# Patient Record
Sex: FEMALE | Race: WHITE | NOT HISPANIC OR LATINO | Employment: UNEMPLOYED | URBAN - METROPOLITAN AREA
[De-identification: names, ages, dates, MRNs, and addresses within clinical notes are randomized per-mention and may not be internally consistent; named-entity substitution may affect disease eponyms.]

---

## 2017-02-23 DIAGNOSIS — Z30.09 FAMILY PLANNING: ICD-10-CM

## 2017-02-23 RX ORDER — LEVONORGESTREL AND ETHINYL ESTRADIOL 90; 20 UG/1; UG/1
1 TABLET ORAL DAILY
Qty: 30 TABLET | Refills: 2 | Status: SHIPPED | OUTPATIENT
Start: 2017-02-23 | End: 2017-05-31 | Stop reason: SDUPTHER

## 2017-02-23 NOTE — TELEPHONE ENCOUNTER
----- Message from Caridad Luu sent at 2/23/2017 12:35 PM CST -----  Contact: SELF  REFILL: levonorgestrel-ethinyl estrad (LALO) 90-20 mcg per tablet

## 2017-02-24 NOTE — TELEPHONE ENCOUNTER
Left message for patient regarding Rx approval, and to call the office to schedule an appointment to see Dr Damon.

## 2017-05-31 ENCOUNTER — OFFICE VISIT (OUTPATIENT)
Dept: OBSTETRICS AND GYNECOLOGY | Facility: CLINIC | Age: 32
End: 2017-05-31
Payer: MEDICAID

## 2017-05-31 VITALS
WEIGHT: 105 LBS | HEIGHT: 59 IN | BODY MASS INDEX: 21.17 KG/M2 | SYSTOLIC BLOOD PRESSURE: 132 MMHG | DIASTOLIC BLOOD PRESSURE: 82 MMHG

## 2017-05-31 DIAGNOSIS — Z30.09 FAMILY PLANNING: ICD-10-CM

## 2017-05-31 DIAGNOSIS — R10.2 FEMALE PELVIC PAIN: ICD-10-CM

## 2017-05-31 DIAGNOSIS — Z87.42 PERSONAL HISTORY OF ENDOMETRIOSIS: ICD-10-CM

## 2017-05-31 DIAGNOSIS — Z01.419 WELL WOMAN EXAM WITH ROUTINE GYNECOLOGICAL EXAM: Primary | ICD-10-CM

## 2017-05-31 PROCEDURE — 87591 N.GONORRHOEAE DNA AMP PROB: CPT

## 2017-05-31 PROCEDURE — 99999 PR PBB SHADOW E&M-EST. PATIENT-LVL III: CPT | Mod: PBBFAC,,, | Performed by: OBSTETRICS & GYNECOLOGY

## 2017-05-31 PROCEDURE — 88175 CYTOPATH C/V AUTO FLUID REDO: CPT

## 2017-05-31 PROCEDURE — 99213 OFFICE O/P EST LOW 20 MIN: CPT | Mod: PBBFAC | Performed by: OBSTETRICS & GYNECOLOGY

## 2017-05-31 PROCEDURE — 99395 PREV VISIT EST AGE 18-39: CPT | Mod: S$PBB,,, | Performed by: OBSTETRICS & GYNECOLOGY

## 2017-05-31 RX ORDER — LEVONORGESTREL AND ETHINYL ESTRADIOL 90; 20 UG/1; UG/1
1 TABLET ORAL DAILY
Qty: 30 TABLET | Refills: 6 | Status: SHIPPED | OUTPATIENT
Start: 2017-05-31 | End: 2018-01-04 | Stop reason: SDUPTHER

## 2017-05-31 NOTE — PROGRESS NOTES
Subjective:       Patient ID: [place] Lebron is a 31 y.o. female.    Chief Complaint:  Annual Exam and Abdominal Pain      History of Present Illness  HPI  Annual Exam-Premenopausal  Patient presents for annual exam. The patient has no complaints today. The patient is sexually active. GYN screening history: last pap: approximate date 2014 and was normal. The patient wears seatbelts: yes. The patient participates in regular exercise: yes. Has the patient ever been transfused or tattooed?: yes. The patient reports that there is not domestic violence in her life.    Patient with a long history of endometriosis.  Status post  sections x 2  History of cervical dysplasia. Status post LEEP      GYN & OB History  Patient's last menstrual period was 2016.   Date of Last Pap: 2014    OB History    Para Term  AB Living   2 2 1 1  2   SAB TAB Ectopic Multiple Live Births       2      # Outcome Date GA Lbr Kelvin/2nd Weight Sex Delivery Anes PTL Lv   2 Term 09 39w0d  3.238 kg (7 lb 2.2 oz) M CS-LTranv Spinal N ANDI   1  04 34w0d  2.24 kg (4 lb 15 oz) F CS-LTranv Gen, Spinal Y ANDI      Complications: Abruptio Placenta        Past Medical History:   Diagnosis Date    Abnormal Pap smear 04/06/10    mild dysplasia    Abnormal Pap smear of cervix     Endometriosis     Hypertension        Past Surgical History:   Procedure Laterality Date    CERVICAL BIOPSY  W/ LOOP ELECTRODE EXCISION  2010    Mild dysplasia     SECTION, LOW TRANSVERSE       SECTION, LOW TRANSVERSE      PELVIC LAPAROSCOPY         Family History   Problem Relation Age of Onset    Hypertension Father     Hypertension Mother        Social History     Social History    Marital status:      Spouse name: N/A    Number of children: N/A    Years of education: N/A     Social History Main Topics    Smoking status: Current Every Day Smoker     Packs/day: 0.50     Years:  13.00    Smokeless tobacco: Never Used    Alcohol use No    Drug use: No    Sexual activity: Not Currently     Partners: Male     Birth control/ protection: OCP     Other Topics Concern    None     Social History Narrative    Together since 4/2015    He is a     She is a homemaker       Current Outpatient Prescriptions   Medication Sig Dispense Refill    levonorgestrel-ethinyl estrad (LALO) 90-20 mcg per tablet Take 1 tablet by mouth once daily. 30 tablet 6     No current facility-administered medications for this visit.        Review of patient's allergies indicates:  No Known Allergies  Review of Systems  Review of Systems   Constitutional: Negative for activity change, appetite change, chills, fatigue, fever and unexpected weight change.   HENT: Negative for mouth sores.    Respiratory: Negative for cough, shortness of breath and wheezing.    Cardiovascular: Negative for chest pain and palpitations.   Gastrointestinal: Positive for abdominal pain. Negative for bloating, blood in stool, constipation, nausea and vomiting.   Endocrine: Negative for diabetes and hot flashes.   Genitourinary: Positive for dyspareunia and pelvic pain. Negative for dysuria, frequency, hematuria, menorrhagia, menstrual problem, urgency, vaginal bleeding, vaginal discharge, vaginal pain, dysmenorrhea, urinary incontinence, postcoital bleeding and vaginal odor.   Musculoskeletal: Negative for back pain and myalgias.   Skin:  Negative for rash.   Neurological: Negative for seizures and headaches.   Psychiatric/Behavioral: Negative for depression and sleep disturbance. The patient is not nervous/anxious.    Breast: Negative for breast mass, breast pain and nipple discharge          Objective:    Physical Exam:   Constitutional: She appears well-developed and well-nourished. No distress.    HENT:   Head: Normocephalic and atraumatic.    Eyes: EOM are normal.    Neck: Normal range of motion.     Pulmonary/Chest: Effort  normal. No respiratory distress.   Breasts: Non-tender, no engorgement, no masses, no retraction, no discharge. Negative for lymphadenopathy.         Abdominal: Soft. She exhibits no distension. There is tenderness. There is no rebound and no guarding.     Genitourinary: Vagina normal and uterus normal. No vaginal discharge found.   Genitourinary Comments: Vulva without any obvious lesions.  Vaginal vault with good support.  Minimal discharge noted.  No obvious lesion.  Cervix is without any cervical motion tenderness.  No obvious lesion except for ectropion.  Uterus is small, non-tender, normal contour.  Adnexa is without any masses or tenderness.           Musculoskeletal: Normal range of motion.       Neurological: She is alert.    Skin: Skin is warm and dry.    Psychiatric: She has a normal mood and affect.   Anxious            Assessment:        1. Well woman exam with routine gynecological exam    2. Female pelvic pain    3. Personal history of endometriosis    4. Family planning    5.  History of cervical dysplasia          Plan:          I have discussed with the patient her condition.  Monthly breast examination was instructed, discussed, and encouraged.  Patient was encouraged to consume a low-calorie, low fat diet, and to increase of physical activity.  Healthy habits encouraged.  A Pap smear was performed.  Mammogram was not ordered because of the combination of her age and risk factors.  Gonorrhea and Chlamydia testing not performed.  HIV test not ordered.  Patient is to continue her medication, Shreya.  She will come back to see me in one year for her annual visit.  She can come back to see me sooner as necessary.  All of her questions were answered appropriately to her satisfaction.

## 2017-06-01 LAB
C TRACH DNA SPEC QL NAA+PROBE: NOT DETECTED
N GONORRHOEA DNA SPEC QL NAA+PROBE: NOT DETECTED

## 2017-06-05 ENCOUNTER — TELEPHONE (OUTPATIENT)
Dept: OBSTETRICS AND GYNECOLOGY | Facility: CLINIC | Age: 32
End: 2017-06-05

## 2017-09-26 ENCOUNTER — TELEPHONE (OUTPATIENT)
Dept: OBSTETRICS AND GYNECOLOGY | Facility: CLINIC | Age: 32
End: 2017-09-26

## 2017-09-26 NOTE — TELEPHONE ENCOUNTER
----- Message from Marina Baptiste sent at 9/26/2017  3:49 PM CDT -----  Contact: self  Pt states she is having a lot of breast pain. She is asking for an appointment before the first available on 10/10.      [phone]

## 2018-01-04 DIAGNOSIS — Z30.09 FAMILY PLANNING: ICD-10-CM

## 2018-01-08 RX ORDER — LEVONORGESTREL AND ETHINYL ESTRADIOL 90; 20 UG/1; UG/1
1 TABLET ORAL DAILY
Qty: 28 TABLET | Refills: 6 | Status: SHIPPED | OUTPATIENT
Start: 2018-01-08 | End: 2018-07-19 | Stop reason: SDUPTHER

## 2018-07-03 ENCOUNTER — OFFICE VISIT (OUTPATIENT)
Dept: OBSTETRICS AND GYNECOLOGY | Facility: CLINIC | Age: 33
End: 2018-07-03
Payer: MEDICAID

## 2018-07-03 VITALS
BODY MASS INDEX: 23.28 KG/M2 | DIASTOLIC BLOOD PRESSURE: 74 MMHG | WEIGHT: 115.5 LBS | SYSTOLIC BLOOD PRESSURE: 110 MMHG | HEIGHT: 59 IN

## 2018-07-03 DIAGNOSIS — Z87.42 PERSONAL HISTORY OF ENDOMETRIOSIS: ICD-10-CM

## 2018-07-03 DIAGNOSIS — Z01.419 WELL WOMAN EXAM WITH ROUTINE GYNECOLOGICAL EXAM: Primary | ICD-10-CM

## 2018-07-03 DIAGNOSIS — R10.2 FEMALE PELVIC PAIN: ICD-10-CM

## 2018-07-03 PROCEDURE — 99213 OFFICE O/P EST LOW 20 MIN: CPT | Mod: PBBFAC | Performed by: OBSTETRICS & GYNECOLOGY

## 2018-07-03 PROCEDURE — 99395 PREV VISIT EST AGE 18-39: CPT | Mod: S$PBB,,, | Performed by: OBSTETRICS & GYNECOLOGY

## 2018-07-03 PROCEDURE — 99999 PR PBB SHADOW E&M-EST. PATIENT-LVL III: CPT | Mod: PBBFAC,,, | Performed by: OBSTETRICS & GYNECOLOGY

## 2018-07-03 PROCEDURE — 87491 CHLMYD TRACH DNA AMP PROBE: CPT

## 2018-07-03 RX ORDER — NAPROXEN 500 MG/1
TABLET ORAL
Refills: 0 | COMMUNITY
Start: 2018-05-31 | End: 2018-07-26

## 2018-07-03 RX ORDER — PROPRANOLOL HYDROCHLORIDE 40 MG/5ML
SOLUTION ORAL 3 TIMES DAILY
COMMUNITY
End: 2021-04-22

## 2018-07-03 RX ORDER — PREDNISONE 20 MG/1
20 TABLET ORAL 2 TIMES DAILY
Refills: 0 | COMMUNITY
Start: 2018-05-11 | End: 2018-07-26

## 2018-07-03 NOTE — PROGRESS NOTES
Subjective:       Patient ID: Ruby Lebron is a 32 y.o. female.    Chief Complaint:  Gynecologic Exam      History of Present Illness  HPI  Annual Exam-Premenopausal  Patient presents for annual exam. The patient has multiple complaints today. The patient is sexually active. GYN screening history: last pap: approximate date  and was normal. The patient wears seatbelts: yes. The patient participates in regular exercise: no. Has the patient ever been transfused or tattooed?: yes. The patient reports that there is not domestic violence in her life.    1. Having left-sided pelvic pain for the past three weeks.  Mostly constant cramping with occasional sharp pulling pain.  No radiation, just localized on left mostly.  No aggravating factor.  Sitting with knees up helps with the pain.  So has soaking in warm water.  Denies fever or chills.  No nausea or vomiting.  Long history of endometriosis and pelvic adhesions and pain.  Status post  sections x 2.    History of placental abruption  History of abnormal Pap, LGSIL  History of multiple laparoscopies.  2.  Breast pain and cysts for the past several weeks.    Long history of fibrocystic breasts.  Tender with masses.  But it gets better...        GYN & OB History  No LMP recorded. Patient is not currently having periods (Reason: Birth Control).   Date of Last Pap: 2017    OB History    Para Term  AB Living   2 2 1 1   2   SAB TAB Ectopic Multiple Live Births           2      # Outcome Date GA Lbr Kelvin/2nd Weight Sex Delivery Anes PTL Lv   2 Term 09 39w0d  3.238 kg (7 lb 2.2 oz) M CS-LTranv Spinal N ANDI   1  04 34w0d  2.24 kg (4 lb 15 oz) F CS-LTranv Gen, Spinal Y ANDI      Complications: Abruptio Placenta        Past Medical History:   Diagnosis Date    Abnormal Pap smear 04/06/10    mild dysplasia    Abnormal Pap smear of cervix     Endometriosis     Hypertension        Past Surgical History:   Procedure  Laterality Date    CERVICAL BIOPSY  W/ LOOP ELECTRODE EXCISION  2010    Mild dysplasia     SECTION, LOW TRANSVERSE       SECTION, LOW TRANSVERSE      PELVIC LAPAROSCOPY         Family History   Problem Relation Age of Onset    Hypertension Father     Hypertension Mother        Social History     Social History    Marital status:      Spouse name: N/A    Number of children: N/A    Years of education: N/A     Social History Main Topics    Smoking status: Current Every Day Smoker     Packs/day: 0.50     Years: 13.00    Smokeless tobacco: Never Used    Alcohol use No    Drug use: No    Sexual activity: Not Currently     Partners: Male     Birth control/ protection: OCP     Other Topics Concern    None     Social History Narrative    Together since 2015    He is a     She is a homemaker       Current Outpatient Prescriptions   Medication Sig Dispense Refill    propranolol (INDERAL) 40 mg/5 mL (8 mg/mL) Soln Take by mouth 3 (three) times daily.      levonorgestrel-ethinyl estrad (LYBREL) 90-20 mcg per tablet take 1 tablet by mouth once daily 28 tablet 6    naproxen (NAPROSYN) 500 MG tablet TK 1 T PO  BID PRN P FOR 10 DAYS  0    predniSONE (DELTASONE) 20 MG tablet Take 20 mg by mouth 2 (two) times daily.  0     No current facility-administered medications for this visit.        Review of patient's allergies indicates:  No Known Allergies    Review of Systems  Review of Systems   Constitutional: Positive for fatigue. Negative for activity change, appetite change, chills, fever and unexpected weight change.   HENT: Negative for mouth sores.    Respiratory: Negative for cough, shortness of breath and wheezing.    Cardiovascular: Negative for chest pain and palpitations.   Gastrointestinal: Positive for abdominal pain. Negative for bloating, blood in stool, constipation, nausea and vomiting.   Endocrine: Negative for diabetes and hot flashes.   Genitourinary:  Positive for pelvic pain. Negative for dyspareunia, dysuria, frequency, hematuria, menorrhagia, menstrual problem, urgency, vaginal bleeding, vaginal discharge, vaginal pain, dysmenorrhea, urinary incontinence, postcoital bleeding and vaginal odor.   Musculoskeletal: Negative for back pain and myalgias.   Skin:  Negative for rash.   Neurological: Negative for seizures and headaches.   Psychiatric/Behavioral: Negative for depression and sleep disturbance. The patient is not nervous/anxious.    Breast: Positive for breast pain.Negative for breast mass and nipple discharge          Objective:    Physical Exam:   Constitutional: She appears well-developed and well-nourished. No distress.    HENT:   Head: Normocephalic and atraumatic.    Eyes: EOM are normal.    Neck: Normal range of motion.     Pulmonary/Chest: Effort normal. No respiratory distress.   Breasts: Non-tender, no engorgement, no masses, no retraction, no discharge. Negative for lymphadenopathy.   No dominant mass        Abdominal: Soft. She exhibits no distension. There is no tenderness. There is no rebound and no guarding.     Genitourinary: Vagina normal and uterus normal. No vaginal discharge found.   Genitourinary Comments: Vulva without any obvious lesions.  Vaginal vault with good support.  Minimal dark red discharge noted.  No obvious lesion.  Cervix is without any cervical motion tenderness.  No obvious lesion except for ectropion.  Uterus is small, non-tender, normal contour.  Adnexa is without any masses.  Bilateral tenderness.               Musculoskeletal: Normal range of motion.       Neurological: She is alert.    Skin: Skin is warm and dry.    Psychiatric: She has a normal mood and affect.          Assessment:        1. Female pelvic pain    2. Personal history of endometriosis    3.  Well woman visit         Plan:          I have discussed with the patient her condition.  Monthly breast examination was instructed, discussed, and encouraged.   Patient was encouraged to consume a low-calorie, low fat diet, and to increase of physical activity.  Healthy habits encouraged.  A Pap smear was NOT performed.  Mammogram was not ordered because of the combination of her age and risk factors.  Gonorrhea and Chlamydia testing performed.  HIV test not ordered.  Patient is to continue her medications as prescribed.  She will come back to see me in one year for her annual visit.  She can come back to see me sooner as necessary.  All of her questions were answered appropriately to her satisfaction.     She will continue with oral contraceptive and naproxen for now  She would need to stop smoking soon.  She might have to stop taking oral contraceptive.    Pelvic ultrasound  Back 2 weeks after ultrasound    Consider total abdominal hysterectomy with bilateral salpingectomy for history of endometriosis, chronic pelvic pain, pelvic adhesions...

## 2018-07-04 LAB
C TRACH DNA SPEC QL NAA+PROBE: NOT DETECTED
N GONORRHOEA DNA SPEC QL NAA+PROBE: NOT DETECTED

## 2018-07-05 ENCOUNTER — TELEPHONE (OUTPATIENT)
Dept: OBSTETRICS AND GYNECOLOGY | Facility: CLINIC | Age: 33
End: 2018-07-05

## 2018-07-05 NOTE — TELEPHONE ENCOUNTER
Pt taking Naprosyn and tylenol and says it is not helping her. Pt wants to know if you will give her something else for the pain. Pt has ultrasound scheduled.

## 2018-07-05 NOTE — TELEPHONE ENCOUNTER
----- Message from Scarlet Benjamin sent at 7/5/2018 10:40 AM CDT -----  Contact: [phone]  Pt is requesting something for pain and wants o speak to you regarding the surgery you spoke of. Pls call pt [phone]. Thanks.......Delfina

## 2018-07-06 NOTE — TELEPHONE ENCOUNTER
----- Message from Ebony Durbin sent at 7/6/2018 10:56 AM CDT -----  Contact: Self/ [phone]  Pt calling to follow up on medication request for pain. Please call to advise. Thank you.  *--------------------------------------  PAIN TO L SIDE  THAT IS SEVERE CRAMPING WITH SHARP SHOOTING PAIN, SHE HAS TO KEEP L LEG DRAWN UP TO EASE THE PAIN , CONTINUES WITH VAGINAL BLEEDING ALSO, HAVING PAIN FOR APPROX 3 WEEKS, PT SAW  ON Monday, SHE HAS AN U.S. ON 7/11/18. SHE IS REQUESTING SOMETHING FOR HER PAIN    MESSAGE FORWARDED TO DR TORRES

## 2018-07-09 ENCOUNTER — TELEPHONE (OUTPATIENT)
Dept: OBSTETRICS AND GYNECOLOGY | Facility: CLINIC | Age: 33
End: 2018-07-09

## 2018-07-09 NOTE — TELEPHONE ENCOUNTER
You 1 minute ago (10:15 AM)         7/9/18 @ 1015 (PHILIPP)  SPOKE WITH MS EZEKIEL, INFORMED HER THAT SHE IS TO CONTINUE WITH THE PAIN MEDICATION THAT DR TORRES HAS ORDERED , INFORMED HER SHE NEEDS TO MAKE AN APPT TO SEE HIM, HE IS OUT OF TOWN THIS WEEK , APPT MADE FOR 7/26/18 @ 4:00 P          Documentation

## 2018-07-09 NOTE — TELEPHONE ENCOUNTER
7/9/18 @ 1015 (PHILIPP)  SPOKE WITH MS FALCON, INFORMED HER THAT SHE IS TO CONTINUE WITH THE PAIN MEDICATION THAT DR TORRES HAS ORDERED , INFORMED HER SHE NEEDS TO MAKE AN APPT TO SEE HIM, HE IS OUT OF TOWN THIS WEEK , APPT MADE FOR 7/26/18 @ 4:00 P

## 2018-07-11 ENCOUNTER — HOSPITAL ENCOUNTER (OUTPATIENT)
Dept: RADIOLOGY | Facility: HOSPITAL | Age: 33
Discharge: HOME | End: 2018-07-11
Attending: OBSTETRICS & GYNECOLOGY
Payer: MEDICAID

## 2018-07-11 DIAGNOSIS — Z87.42 PERSONAL HISTORY OF ENDOMETRIOSIS: ICD-10-CM

## 2018-07-11 DIAGNOSIS — R10.2 FEMALE PELVIC PAIN: ICD-10-CM

## 2018-07-11 PROCEDURE — 76830 TRANSVAGINAL US NON-OB: CPT | Mod: TC

## 2018-07-11 PROCEDURE — 76830 TRANSVAGINAL US NON-OB: CPT | Mod: 26,,, | Performed by: RADIOLOGY

## 2018-07-11 PROCEDURE — 76856 US EXAM PELVIC COMPLETE: CPT | Mod: TC

## 2018-07-11 PROCEDURE — 76856 US EXAM PELVIC COMPLETE: CPT | Mod: 26,,, | Performed by: RADIOLOGY

## 2018-07-19 ENCOUNTER — TELEPHONE (OUTPATIENT)
Dept: OBSTETRICS AND GYNECOLOGY | Facility: CLINIC | Age: 33
End: 2018-07-19

## 2018-07-19 DIAGNOSIS — Z30.09 FAMILY PLANNING: ICD-10-CM

## 2018-07-19 RX ORDER — LEVONORGESTREL AND ETHINYL ESTRADIOL 90; 20 UG/1; UG/1
1 TABLET ORAL DAILY
Qty: 28 TABLET | Refills: 6 | Status: SHIPPED | OUTPATIENT
Start: 2018-07-19 | End: 2018-08-24

## 2018-07-19 NOTE — TELEPHONE ENCOUNTER
----- Message from Brittany Naranjo sent at 7/19/2018  1:37 PM CDT -----  Contact: self  Patient needs a refill of birth control please call back at [phone]    [facility] [facility] - [place] - [address] [phone]

## 2018-07-19 NOTE — TELEPHONE ENCOUNTER
Called pt to inform her that per her request, birth control has been refilled. No answer. Lm for a call back to the office if any further questions. CW

## 2018-07-20 NOTE — TELEPHONE ENCOUNTER
Called pt again to inform her birth control refill has been submitted. No answer. Lm for call back if any further questions. CW

## 2018-07-26 ENCOUNTER — OFFICE VISIT (OUTPATIENT)
Dept: OBSTETRICS AND GYNECOLOGY | Facility: CLINIC | Age: 33
End: 2018-07-26
Payer: MEDICAID

## 2018-07-26 VITALS
HEIGHT: 59 IN | DIASTOLIC BLOOD PRESSURE: 74 MMHG | SYSTOLIC BLOOD PRESSURE: 124 MMHG | BODY MASS INDEX: 23.51 KG/M2 | WEIGHT: 116.63 LBS

## 2018-07-26 DIAGNOSIS — R10.2 FEMALE PELVIC PAIN: ICD-10-CM

## 2018-07-26 DIAGNOSIS — Z87.42 PERSONAL HISTORY OF ENDOMETRIOSIS: Primary | ICD-10-CM

## 2018-07-26 PROCEDURE — 99213 OFFICE O/P EST LOW 20 MIN: CPT | Mod: PBBFAC | Performed by: OBSTETRICS & GYNECOLOGY

## 2018-07-26 PROCEDURE — 99999 PR PBB SHADOW E&M-EST. PATIENT-LVL III: CPT | Mod: PBBFAC,,, | Performed by: OBSTETRICS & GYNECOLOGY

## 2018-07-26 PROCEDURE — 99213 OFFICE O/P EST LOW 20 MIN: CPT | Mod: S$PBB,,, | Performed by: OBSTETRICS & GYNECOLOGY

## 2018-07-26 NOTE — PATIENT INSTRUCTIONS
You are scheduled for total laparoscopic hysterectomy with left salpingo-oophorectomy with right salpingectomy on 8/15/2018

## 2018-07-26 NOTE — PROGRESS NOTES
Subjective:       Patient ID: Ruby Lebron is a 32 y.o. female.    Chief Complaint:  Follow-up (follow up ultrasound for pelvic pain)      History of Present Illness  HPI  Patient comes in today requesting surgery.  Seen on 7/3/2018 with severe left-sided pain.  Long history of endometriosis and pelvic adhesions and pain.  Status post  sections x 2.    History of placental abruption  History of abnormal Pap, LGSIL  History of multiple laparoscopies.    Pelvic ultrasound on 2018 shows  The uterus measures 6.9 by 3.2 x 3.9 cm.  The endometrial stripe measures 5 mm.  The right ovary measures 3.2 x 2.1 x 2.5 cm in the left ovary measures 2.2 x 1.3 x 1.6 cm.  No free fluid is seen.        Would like to get hysterectomy for pelvic pain, endometriosis.  Risks and benefits discussed    GYN & OB History  Patient's last menstrual period was 2018 (exact date).   Date of Last Pap: 2017    OB History    Para Term  AB Living   2 2 1 1   2   SAB TAB Ectopic Multiple Live Births           2      # Outcome Date GA Lbr Kelvin/2nd Weight Sex Delivery Anes PTL Lv   2 Term 09 39w0d  3.238 kg (7 lb 2.2 oz) M CS-LTranv Spinal N ANDI   1  04 34w0d  2.24 kg (4 lb 15 oz) F CS-LTranv Gen, Spinal Y ANDI      Complications: Abruptio Placenta        Past Medical History:   Diagnosis Date    Abnormal Pap smear 04/06/10    mild dysplasia    Abnormal Pap smear of cervix     Endometriosis     Hypertension        Past Surgical History:   Procedure Laterality Date    CERVICAL BIOPSY  W/ LOOP ELECTRODE EXCISION  2010    Mild dysplasia     SECTION, LOW TRANSVERSE       SECTION, LOW TRANSVERSE      PELVIC LAPAROSCOPY         Family History   Problem Relation Age of Onset    Hypertension Father     Hypertension Mother        Social History     Social History    Marital status:      Spouse name: N/A    Number of children: N/A    Years of education:  N/A     Social History Main Topics    Smoking status: Current Every Day Smoker     Packs/day: 0.50     Years: 13.00    Smokeless tobacco: Never Used    Alcohol use No    Drug use: No    Sexual activity: Not Currently     Partners: Male     Birth control/ protection: OCP     Other Topics Concern    None     Social History Narrative    Together since 4/2015    He is a     She is a homemaker       Current Outpatient Prescriptions   Medication Sig Dispense Refill    levonorgestrel-ethinyl estrad (LYBREL) 90-20 mcg per tablet Take 1 tablet by mouth once daily. 28 tablet 6    propranolol (INDERAL) 40 mg/5 mL (8 mg/mL) Soln Take by mouth 3 (three) times daily.       No current facility-administered medications for this visit.        Review of patient's allergies indicates:  No Known Allergies    Review of Systems  Review of Systems   Constitutional: Negative for activity change, appetite change, chills, fatigue, fever and unexpected weight change.   HENT: Negative for mouth sores.    Respiratory: Negative for cough, shortness of breath and wheezing.    Cardiovascular: Negative for chest pain and palpitations.   Gastrointestinal: Positive for abdominal pain. Negative for bloating, blood in stool, constipation, nausea and vomiting.   Endocrine: Negative for diabetes and hot flashes.   Genitourinary: Positive for pelvic pain. Negative for dyspareunia, dysuria, frequency, hematuria, menorrhagia, menstrual problem, urgency, vaginal bleeding, vaginal discharge, vaginal pain, dysmenorrhea, urinary incontinence, postcoital bleeding and vaginal odor.        Left-sided pelvic pain   Musculoskeletal: Negative for back pain and myalgias.   Skin:  Negative for rash.   Neurological: Negative for seizures and headaches.   Psychiatric/Behavioral: Negative for depression and sleep disturbance. The patient is not nervous/anxious.    Breast: Positive for breast pain.Negative for breast mass and nipple discharge           Objective:    Physical Exam:   Constitutional: She appears well-developed and well-nourished. No distress.   In pain    HENT:   Head: Normocephalic and atraumatic.    Eyes: EOM are normal.    Neck: Normal range of motion.    Cardiovascular: Normal rate.     Pulmonary/Chest: Effort normal. No respiratory distress.                  Musculoskeletal: Normal range of motion.       Neurological: She is alert.    Skin: Skin is warm and dry.    Psychiatric: She has a normal mood and affect.          Assessment:        1. Personal history of endometriosis    2. Female pelvic pain    3.  Status post  sections x 2  4.   History of pelvic adhesions         Plan:      I have extensively discussed with the patient regarding her condition  We discussed surgery for endometriosis  No longer wants to have children  Would like to have left ovary removed for pain    We discussed possible TLHBSx with left oophorectomy  Risks and benefits of surgery discussed, including risks of bladder and bowel injury, possible laparotomy and  Abdominal  hysterectomy  We will try to schedule surgery for 8/15/2018.  Would need to call surgery tomorrow to schedule.    Possible preop on 2018.

## 2018-07-27 DIAGNOSIS — R10.2 FEMALE PELVIC PAIN: ICD-10-CM

## 2018-07-27 DIAGNOSIS — Z87.42 PERSONAL HISTORY OF ENDOMETRIOSIS: Primary | ICD-10-CM

## 2018-08-10 ENCOUNTER — HOSPITAL ENCOUNTER (OUTPATIENT)
Dept: PREADMISSION TESTING | Facility: HOSPITAL | Age: 33
Discharge: HOME | End: 2018-08-10
Attending: OBSTETRICS & GYNECOLOGY
Payer: MEDICAID

## 2018-08-10 ENCOUNTER — HOSPITAL ENCOUNTER (OUTPATIENT)
Dept: RADIOLOGY | Facility: HOSPITAL | Age: 33
Discharge: HOME | End: 2018-08-10
Attending: OBSTETRICS & GYNECOLOGY
Payer: MEDICAID

## 2018-08-10 ENCOUNTER — OFFICE VISIT (OUTPATIENT)
Dept: OBSTETRICS AND GYNECOLOGY | Facility: CLINIC | Age: 33
End: 2018-08-10
Payer: MEDICAID

## 2018-08-10 VITALS
BODY MASS INDEX: 24.13 KG/M2 | SYSTOLIC BLOOD PRESSURE: 134 MMHG | DIASTOLIC BLOOD PRESSURE: 82 MMHG | WEIGHT: 119.69 LBS | TEMPERATURE: 98 F | HEIGHT: 59 IN

## 2018-08-10 VITALS
HEIGHT: 59 IN | BODY MASS INDEX: 24.18 KG/M2 | SYSTOLIC BLOOD PRESSURE: 123 MMHG | RESPIRATION RATE: 16 BRPM | OXYGEN SATURATION: 99 % | HEART RATE: 85 BPM | DIASTOLIC BLOOD PRESSURE: 85 MMHG | TEMPERATURE: 98 F | WEIGHT: 119.94 LBS

## 2018-08-10 DIAGNOSIS — Z87.42 PERSONAL HISTORY OF ENDOMETRIOSIS: ICD-10-CM

## 2018-08-10 DIAGNOSIS — Z87.42 PERSONAL HISTORY OF ENDOMETRIOSIS: Primary | ICD-10-CM

## 2018-08-10 DIAGNOSIS — R10.2 FEMALE PELVIC PAIN: ICD-10-CM

## 2018-08-10 DIAGNOSIS — Z01.818 PRE-OP TESTING: Primary | ICD-10-CM

## 2018-08-10 LAB
ALBUMIN SERPL BCP-MCNC: 4 G/DL
ALP SERPL-CCNC: 69 U/L
ALT SERPL W/O P-5'-P-CCNC: 22 U/L
ANION GAP SERPL CALC-SCNC: 5 MMOL/L
AST SERPL-CCNC: 28 U/L
BACTERIA #/AREA URNS HPF: NORMAL /HPF
BASOPHILS # BLD AUTO: 0.04 K/UL
BASOPHILS NFR BLD: 0.4 %
BILIRUB SERPL-MCNC: 0.4 MG/DL
BILIRUB UR QL STRIP: NEGATIVE
BUN SERPL-MCNC: 10 MG/DL
CALCIUM SERPL-MCNC: 9 MG/DL
CHLORIDE SERPL-SCNC: 107 MMOL/L
CLARITY UR: CLEAR
CO2 SERPL-SCNC: 24 MMOL/L
COLOR UR: YELLOW
CREAT SERPL-MCNC: 0.7 MG/DL
DIFFERENTIAL METHOD: ABNORMAL
EOSINOPHIL # BLD AUTO: 0.1 K/UL
EOSINOPHIL NFR BLD: 0.9 %
ERYTHROCYTE [DISTWIDTH] IN BLOOD BY AUTOMATED COUNT: 12.1 %
EST. GFR  (AFRICAN AMERICAN): >60 ML/MIN/1.73 M^2
EST. GFR  (NON AFRICAN AMERICAN): >60 ML/MIN/1.73 M^2
GLUCOSE SERPL-MCNC: 83 MG/DL
GLUCOSE UR QL STRIP: NEGATIVE
HCT VFR BLD AUTO: 35.9 %
HGB BLD-MCNC: 12.1 G/DL
HGB UR QL STRIP: ABNORMAL
KETONES UR QL STRIP: NEGATIVE
LEUKOCYTE ESTERASE UR QL STRIP: NEGATIVE
LYMPHOCYTES # BLD AUTO: 4.8 K/UL
LYMPHOCYTES NFR BLD: 50.7 %
MCH RBC QN AUTO: 31.3 PG
MCHC RBC AUTO-ENTMCNC: 33.7 G/DL
MCV RBC AUTO: 93 FL
MICROSCOPIC COMMENT: NORMAL
MONOCYTES # BLD AUTO: 0.6 K/UL
MONOCYTES NFR BLD: 6.1 %
NEUTROPHILS # BLD AUTO: 4 K/UL
NEUTROPHILS NFR BLD: 41.8 %
NITRITE UR QL STRIP: NEGATIVE
PH UR STRIP: 5 [PH] (ref 5–8)
PLATELET # BLD AUTO: 305 K/UL
PMV BLD AUTO: 9.3 FL
POTASSIUM SERPL-SCNC: 4.2 MMOL/L
PROT SERPL-MCNC: 6.9 G/DL
PROT UR QL STRIP: NEGATIVE
RBC # BLD AUTO: 3.86 M/UL
RBC #/AREA URNS HPF: 1 /HPF (ref 0–4)
SODIUM SERPL-SCNC: 136 MMOL/L
SP GR UR STRIP: 1.02 (ref 1–1.03)
SQUAMOUS #/AREA URNS HPF: 2 /HPF
URN SPEC COLLECT METH UR: ABNORMAL
UROBILINOGEN UR STRIP-ACNC: NEGATIVE EU/DL
WBC # BLD AUTO: 9.52 K/UL

## 2018-08-10 PROCEDURE — 80053 COMPREHEN METABOLIC PANEL: CPT

## 2018-08-10 PROCEDURE — 99213 OFFICE O/P EST LOW 20 MIN: CPT | Mod: PBBFAC,25 | Performed by: OBSTETRICS & GYNECOLOGY

## 2018-08-10 PROCEDURE — 71046 X-RAY EXAM CHEST 2 VIEWS: CPT | Mod: TC,FY

## 2018-08-10 PROCEDURE — 85025 COMPLETE CBC W/AUTO DIFF WBC: CPT

## 2018-08-10 PROCEDURE — 99999 PR PBB SHADOW E&M-EST. PATIENT-LVL III: CPT | Mod: PBBFAC,,, | Performed by: OBSTETRICS & GYNECOLOGY

## 2018-08-10 PROCEDURE — 93005 ELECTROCARDIOGRAM TRACING: CPT

## 2018-08-10 PROCEDURE — 99499 UNLISTED E&M SERVICE: CPT | Mod: S$PBB,,, | Performed by: OBSTETRICS & GYNECOLOGY

## 2018-08-10 PROCEDURE — 71046 X-RAY EXAM CHEST 2 VIEWS: CPT | Mod: 26,,, | Performed by: RADIOLOGY

## 2018-08-10 PROCEDURE — 36415 COLL VENOUS BLD VENIPUNCTURE: CPT

## 2018-08-10 PROCEDURE — 81000 URINALYSIS NONAUTO W/SCOPE: CPT

## 2018-08-10 PROCEDURE — 93010 ELECTROCARDIOGRAM REPORT: CPT | Mod: ,,, | Performed by: INTERNAL MEDICINE

## 2018-08-10 RX ORDER — SODIUM CHLORIDE, SODIUM LACTATE, POTASSIUM CHLORIDE, CALCIUM CHLORIDE 600; 310; 30; 20 MG/100ML; MG/100ML; MG/100ML; MG/100ML
INJECTION, SOLUTION INTRAVENOUS CONTINUOUS
Status: CANCELLED | OUTPATIENT
Start: 2018-08-10

## 2018-08-10 NOTE — DISCHARGE INSTRUCTIONS
For this procedure you will shower at home the night before and also the morning of surgery with HIBICLENS soap provided by the pre op nurse. Do not use this soap on your face or genitals. You will shower a third time here at the hospital on the morning of surgery. Rinse completely after each shower.  Please place clean linens on your bed the night before surgery. Please wear fresh clean clothing after each shower.  No shaving of procedural area at least 4-5 days before surgery due to  increased risk of skin irritation and/or possible infection.        Your surgery is scheduled for _Wednesday Aug. 15, 2018___.    Call [phone] between 2 p.m. and 5 p.m. on   _Tuesday___ to find out your arrival time for the day of your surgery.      Please report to SAME DAY SURGERY UNIT on the 2nd FLOOR at _______ a.m.  Use front door entrance. The doors open at 0530 am.        INSTRUCTIONS IMPORTANT!!!  ¨ Do not eat or drink after 12 midnight-including water. OK to brush teeth, no   gum, candy or mints!    ¨ Take only these medicines with a small swallow of water-morning of surgery.  Take Propanolol am of surgery with swallow of water.            _x___  Return to Hospital Lab on Craig Aug. 12, 2018 at 1:00 PM__for additional blood test.    x____  Prep instructions   SHOWER     ____  Please shower using Hibiclens soap the night before AND  the morning of your surgery/procedure. Do not use Hibiclens on your face or genitals               If your surgery is around your belly button (Navel) be sure to wash inside your belly button also. Rinse hibiclens off completely.  x____  No shaving of procedural area at least 4-5 days before surgery due to increased risk of skin irritation and/or possible infection.  _x___  Do not wear makeup, including mascara. WEARING EYE MAKEUP MAY   LEAD TO SERIOUS EYE INJURY during surgery.  x____  No powder, lotions or creams to your body.  _x___  You may wear only deodorant on the day of surgery.  _x___   Please remove all jewelry, including piercings and leave at home.  _x___  No money or valuables needed. Please leave at home.  You may bring your cell phone.  ____  Please bring any documents given by your doctor.  _x___  If going home the same day, arrange for a ride home. You will not be able to   drive if Anesthesia was used.  ____  Children under 18 years require a parent / guardian present the entire time   they are in surgery / recovery.  _x___  Wear loose fitting clothing. Allow for dressings, bandages.  _x___  Stop Aspirin, Ibuprofen, Motrin and Aleve at least 3-5 days before surgery, unless otherwise instructed by your doctor, or the nurse.              You MAY use Tylenol/acetaminophen until day of surgery.  ____  If you take diabetic medication, do not take am of surgery unless instructed by   Doctor.  _x___  Call MD for temperature above 101 degrees.        _x___ Stop taking any Fish Oil supplement or any Vitamins that contain Vitamin  E at least 5 days prior to surgery.              I have read or had read and explained to me, and understand the above information.  Additional comments or instructions:Please call   [phone] if you have any questions regarding the instructions above.

## 2018-08-10 NOTE — PATIENT INSTRUCTIONS
Stop all non-steroidal anti-inflammatory medications.  Light dinner the night before surgery.  Nothing by mouth the morning of surgery.  Take a shower, washing the lower abdomen the morning of surgery.  No shaving around surgical site several days prior to surgery.     We will remove:  1.  Uterus  2.  Cervix  3.  Tubes   4.  Left ovary

## 2018-08-10 NOTE — H&P (VIEW-ONLY)
Subjective:       Patient ID: Ruby Lebron is a 32 y.o. female.    Chief Complaint:  Pre-op Exam (Pre-op for TLH scheduled on 08/15/18)      History of Present Illness  HPI  Patient comes in today for preoperative consultation and examination.  Seen on 7/3/2018 with severe left-sided pain.  Long history of endometriosis and pelvic adhesions and pain.  Status post  sections x 2.    History of placental abruption  History of abnormal Pap, LGSIL  History of multiple laparoscopies.     Pelvic ultrasound on 2018 shows  The uterus measures 6.9 by 3.2 x 3.9 cm.  The endometrial stripe measures 5 mm.  The right ovary measures 3.2 x 2.1 x 2.5 cm in the left ovary measures 2.2 x 1.3 x 1.6 cm.  No free fluid is seen.          Would like to get hysterectomy for pelvic pain, endometriosis.  Risks and benefits discussed    Total laparoscopic hysterectomy with left salpingo-oophorectomy with right salpingectomy scheduled on 8/15/2018                GYN & OB History  No LMP recorded. Patient is not currently having periods (Reason: Birth Control).   Date of Last Pap: 2017    OB History    Para Term  AB Living   2 2 1 1   2   SAB TAB Ectopic Multiple Live Births           2      # Outcome Date GA Lbr Kelvin/2nd Weight Sex Delivery Anes PTL Lv   2 Term 09 39w0d  3.238 kg (7 lb 2.2 oz) M CS-LTranv Spinal N ANDI   1  04 34w0d  2.24 kg (4 lb 15 oz) F CS-LTranv Gen, Spinal Y ANDI      Complications: Abruptio Placenta        Past Medical History:   Diagnosis Date    Abnormal Pap smear 04/06/10    mild dysplasia    Abnormal Pap smear of cervix     Endometriosis     Hypertension        Past Surgical History:   Procedure Laterality Date    CERVICAL BIOPSY  W/ LOOP ELECTRODE EXCISION  2010    Mild dysplasia     SECTION, LOW TRANSVERSE       SECTION, LOW TRANSVERSE      PELVIC LAPAROSCOPY         Family History   Problem Relation Age of Onset     Hypertension Father     Hypertension Mother        Social History     Social History    Marital status:      Spouse name: N/A    Number of children: N/A    Years of education: N/A     Social History Main Topics    Smoking status: Current Every Day Smoker     Packs/day: 0.50     Years: 13.00    Smokeless tobacco: Never Used    Alcohol use No    Drug use: No    Sexual activity: Not Currently     Partners: Male     Birth control/ protection: OCP     Other Topics Concern    None     Social History Narrative    Together since 4/2015    He is a     She is a homemaker       Current Outpatient Prescriptions   Medication Sig Dispense Refill    levonorgestrel-ethinyl estrad (LYBREL) 90-20 mcg per tablet Take 1 tablet by mouth once daily. 28 tablet 6    propranolol (INDERAL) 40 mg/5 mL (8 mg/mL) Soln Take by mouth 3 (three) times daily.       No current facility-administered medications for this visit.        Review of patient's allergies indicates:  No Known Allergies    Review of Systems  Review of Systems   Constitutional: Negative for activity change, appetite change, chills, fatigue, fever and unexpected weight change.   HENT: Negative for mouth sores.    Respiratory: Negative for cough, shortness of breath and wheezing.    Cardiovascular: Negative for chest pain and palpitations.   Gastrointestinal: Positive for abdominal pain. Negative for bloating, blood in stool, constipation, nausea and vomiting.   Endocrine: Negative for diabetes and hot flashes.   Genitourinary: Positive for pelvic pain. Negative for dyspareunia, dysuria, frequency, hematuria, menorrhagia, menstrual problem, urgency, vaginal bleeding, vaginal discharge, vaginal pain, dysmenorrhea, urinary incontinence, postcoital bleeding and vaginal odor.        Left-sided pelvic pain   Musculoskeletal: Negative for back pain and myalgias.   Skin:  Negative for rash.   Neurological: Negative for seizures and headaches.    Psychiatric/Behavioral: Negative for depression and sleep disturbance. The patient is not nervous/anxious.    Breast: Positive for breast pain.Negative for breast mass and nipple discharge          Objective:    Physical Exam:   Constitutional: She appears well-developed and well-nourished. No distress.    HENT:   Head: Normocephalic and atraumatic.    Eyes: EOM are normal.    Neck: Normal range of motion.    Cardiovascular: Normal rate, regular rhythm and normal heart sounds.     Pulmonary/Chest: Effort normal and breath sounds normal. No respiratory distress.   Breasts: Non-tender, no engorgement, no masses, no retraction, no discharge. Negative for lymphadenopathy.   No dominant mass        Abdominal: Soft. She exhibits no distension. There is no tenderness. There is no rebound and no guarding.     Genitourinary: Vagina normal and uterus normal. No vaginal discharge found.   Genitourinary Comments: Vulva without any obvious lesions.  Vaginal vault with good support.  Minimal dark red discharge noted.  No obvious lesion.  Cervix is without any cervical motion tenderness.  No obvious lesion except for ectropion.  Uterus is small, non-tender, normal contour.  Adnexa is without any masses.  Bilateral tenderness.               Musculoskeletal: Normal range of motion.       Neurological: She is alert.    Skin: Skin is warm and dry.    Psychiatric: She has a normal mood and affect.          Assessment:        1. Personal history of endometriosis    2. Female pelvic pain              Plan:      I have again extensively discussed with the patient her condition.  The proposed procedures of   total laparoscopic hysterectomy with left salpingo-oophorectomy with right salpingectomy         explained to and again extensively discussed with the patient.  Questions answered.  Consents signed.  Orders written.   Patient will go over to the hospital for preoperative care prior to the day of admission.  She will undergo surgery  on 8/15/2018 at 1030 AM.

## 2018-08-12 ENCOUNTER — LAB VISIT (OUTPATIENT)
Dept: LAB | Facility: HOSPITAL | Age: 33
End: 2018-08-12
Attending: OBSTETRICS & GYNECOLOGY
Payer: MEDICAID

## 2018-08-12 DIAGNOSIS — Z01.818 PRE-OP TESTING: ICD-10-CM

## 2018-08-12 LAB
ABO + RH BLD: NORMAL
B-HCG UR QL: NEGATIVE
BLD GP AB SCN CELLS X3 SERPL QL: NORMAL

## 2018-08-12 PROCEDURE — 86901 BLOOD TYPING SEROLOGIC RH(D): CPT

## 2018-08-12 PROCEDURE — 36415 COLL VENOUS BLD VENIPUNCTURE: CPT

## 2018-08-12 PROCEDURE — 81025 URINE PREGNANCY TEST: CPT

## 2018-08-14 ENCOUNTER — ANESTHESIA EVENT (OUTPATIENT)
Dept: SURGERY | Facility: HOSPITAL | Age: 33
End: 2018-08-14
Payer: MEDICAID

## 2018-08-15 ENCOUNTER — HOSPITAL ENCOUNTER (OUTPATIENT)
Facility: HOSPITAL | Age: 33
Discharge: HOME | End: 2018-08-16
Attending: OBSTETRICS & GYNECOLOGY | Admitting: OBSTETRICS & GYNECOLOGY
Payer: MEDICAID

## 2018-08-15 ENCOUNTER — ANESTHESIA (OUTPATIENT)
Dept: SURGERY | Facility: HOSPITAL | Age: 33
End: 2018-08-15
Payer: MEDICAID

## 2018-08-15 DIAGNOSIS — Z87.42 PERSONAL HISTORY OF ENDOMETRIOSIS: ICD-10-CM

## 2018-08-15 DIAGNOSIS — R10.2 FEMALE PELVIC PAIN: ICD-10-CM

## 2018-08-15 DIAGNOSIS — Z90.710 STATUS POST LAPAROSCOPIC HYSTERECTOMY: Primary | ICD-10-CM

## 2018-08-15 LAB — POCT GLUCOSE: 70 MG/DL (ref 70–110)

## 2018-08-15 PROCEDURE — 25000003 PHARM REV CODE 250: Performed by: NURSE ANESTHETIST, CERTIFIED REGISTERED

## 2018-08-15 PROCEDURE — 25000003 PHARM REV CODE 250: Performed by: ANESTHESIOLOGY

## 2018-08-15 PROCEDURE — 36000710: Performed by: OBSTETRICS & GYNECOLOGY

## 2018-08-15 PROCEDURE — 63600175 PHARM REV CODE 636 W HCPCS: Performed by: OBSTETRICS & GYNECOLOGY

## 2018-08-15 PROCEDURE — 63600175 PHARM REV CODE 636 W HCPCS: Performed by: ANESTHESIOLOGY

## 2018-08-15 PROCEDURE — C2628 CATHETER, OCCLUSION: HCPCS | Performed by: OBSTETRICS & GYNECOLOGY

## 2018-08-15 PROCEDURE — 27201423 OPTIME MED/SURG SUP & DEVICES STERILE SUPPLY: Performed by: OBSTETRICS & GYNECOLOGY

## 2018-08-15 PROCEDURE — 00840 ANES IPER PX LOWER ABD NOS: CPT | Performed by: OBSTETRICS & GYNECOLOGY

## 2018-08-15 PROCEDURE — 36000711: Performed by: OBSTETRICS & GYNECOLOGY

## 2018-08-15 PROCEDURE — 25000003 PHARM REV CODE 250: Performed by: OBSTETRICS & GYNECOLOGY

## 2018-08-15 PROCEDURE — 71000033 HC RECOVERY, INTIAL HOUR: Performed by: OBSTETRICS & GYNECOLOGY

## 2018-08-15 PROCEDURE — 58571 TLH W/T/O 250 G OR LESS: CPT | Mod: ,,, | Performed by: OBSTETRICS & GYNECOLOGY

## 2018-08-15 PROCEDURE — 88307 TISSUE EXAM BY PATHOLOGIST: CPT | Performed by: PATHOLOGY

## 2018-08-15 PROCEDURE — 37000008 HC ANESTHESIA 1ST 15 MINUTES: Performed by: OBSTETRICS & GYNECOLOGY

## 2018-08-15 PROCEDURE — 37000009 HC ANESTHESIA EA ADD 15 MINS: Performed by: OBSTETRICS & GYNECOLOGY

## 2018-08-15 PROCEDURE — D9220A PRA ANESTHESIA: Mod: ANES,,, | Performed by: ANESTHESIOLOGY

## 2018-08-15 PROCEDURE — 71000039 HC RECOVERY, EACH ADD'L HOUR: Performed by: OBSTETRICS & GYNECOLOGY

## 2018-08-15 PROCEDURE — 63600175 PHARM REV CODE 636 W HCPCS: Performed by: NURSE ANESTHETIST, CERTIFIED REGISTERED

## 2018-08-15 PROCEDURE — 88307 TISSUE EXAM BY PATHOLOGIST: CPT | Mod: 26,,, | Performed by: PATHOLOGY

## 2018-08-15 PROCEDURE — 58571 TLH W/T/O 250 G OR LESS: CPT | Mod: 80,,, | Performed by: OBSTETRICS & GYNECOLOGY

## 2018-08-15 PROCEDURE — 71000015 HC POSTOP RECOV 1ST HR: Performed by: OBSTETRICS & GYNECOLOGY

## 2018-08-15 PROCEDURE — D9220A PRA ANESTHESIA: Mod: CRNA,,, | Performed by: NURSE ANESTHETIST, CERTIFIED REGISTERED

## 2018-08-15 RX ORDER — IBUPROFEN 600 MG/1
600 TABLET ORAL EVERY 6 HOURS PRN
Qty: 40 TABLET | Refills: 1 | Status: SHIPPED | OUTPATIENT
Start: 2018-08-15 | End: 2018-09-26

## 2018-08-15 RX ORDER — HYDROMORPHONE HYDROCHLORIDE 2 MG/1
2 TABLET ORAL ONCE
Status: COMPLETED | OUTPATIENT
Start: 2018-08-15 | End: 2018-08-15

## 2018-08-15 RX ORDER — HYDROMORPHONE HYDROCHLORIDE 2 MG/ML
0.2 INJECTION, SOLUTION INTRAMUSCULAR; INTRAVENOUS; SUBCUTANEOUS EVERY 5 MIN PRN
Status: COMPLETED | OUTPATIENT
Start: 2018-08-15 | End: 2018-08-15

## 2018-08-15 RX ORDER — HYDROMORPHONE HYDROCHLORIDE 2 MG/1
2 TABLET ORAL EVERY 4 HOURS PRN
Status: DISCONTINUED | OUTPATIENT
Start: 2018-08-15 | End: 2018-08-16 | Stop reason: HOSPADM

## 2018-08-15 RX ORDER — OXYCODONE AND ACETAMINOPHEN 5; 325 MG/1; MG/1
1 TABLET ORAL EVERY 4 HOURS PRN
Qty: 20 TABLET | Refills: 0 | Status: SHIPPED | OUTPATIENT
Start: 2018-08-15 | End: 2018-08-16 | Stop reason: HOSPADM

## 2018-08-15 RX ORDER — SODIUM CHLORIDE 0.9 % (FLUSH) 0.9 %
3 SYRINGE (ML) INJECTION
Status: DISCONTINUED | OUTPATIENT
Start: 2018-08-15 | End: 2018-08-16 | Stop reason: HOSPADM

## 2018-08-15 RX ORDER — LIDOCAINE HCL/PF 100 MG/5ML
SYRINGE (ML) INTRAVENOUS
Status: DISCONTINUED | OUTPATIENT
Start: 2018-08-15 | End: 2018-08-15

## 2018-08-15 RX ORDER — FENTANYL CITRATE 50 UG/ML
INJECTION, SOLUTION INTRAMUSCULAR; INTRAVENOUS
Status: DISCONTINUED | OUTPATIENT
Start: 2018-08-15 | End: 2018-08-15

## 2018-08-15 RX ORDER — SODIUM CHLORIDE 0.9 G/100ML
IRRIGANT IRRIGATION
Status: DISCONTINUED | OUTPATIENT
Start: 2018-08-15 | End: 2018-08-15 | Stop reason: HOSPADM

## 2018-08-15 RX ORDER — MORPHINE SULFATE 4 MG/ML
2 INJECTION, SOLUTION INTRAMUSCULAR; INTRAVENOUS EVERY 5 MIN PRN
Status: DISCONTINUED | OUTPATIENT
Start: 2018-08-15 | End: 2018-08-15 | Stop reason: HOSPADM

## 2018-08-15 RX ORDER — SODIUM CHLORIDE, SODIUM LACTATE, POTASSIUM CHLORIDE, CALCIUM CHLORIDE 600; 310; 30; 20 MG/100ML; MG/100ML; MG/100ML; MG/100ML
INJECTION, SOLUTION INTRAVENOUS CONTINUOUS
Status: DISCONTINUED | OUTPATIENT
Start: 2018-08-15 | End: 2018-08-16 | Stop reason: HOSPADM

## 2018-08-15 RX ORDER — METOCLOPRAMIDE HYDROCHLORIDE 5 MG/ML
INJECTION INTRAMUSCULAR; INTRAVENOUS
Status: DISCONTINUED | OUTPATIENT
Start: 2018-08-15 | End: 2018-08-15

## 2018-08-15 RX ORDER — ACETAMINOPHEN 10 MG/ML
1000 INJECTION, SOLUTION INTRAVENOUS ONCE
Status: COMPLETED | OUTPATIENT
Start: 2018-08-15 | End: 2018-08-15

## 2018-08-15 RX ORDER — SIMETHICONE 80 MG
1 TABLET,CHEWABLE ORAL 3 TIMES DAILY PRN
Status: DISCONTINUED | OUTPATIENT
Start: 2018-08-15 | End: 2018-08-16 | Stop reason: HOSPADM

## 2018-08-15 RX ORDER — LIDOCAINE HYDROCHLORIDE 20 MG/ML
JELLY TOPICAL
Status: DISCONTINUED | OUTPATIENT
Start: 2018-08-15 | End: 2018-08-15

## 2018-08-15 RX ORDER — OXYCODONE AND ACETAMINOPHEN 5; 325 MG/1; MG/1
1 TABLET ORAL
Status: DISCONTINUED | OUTPATIENT
Start: 2018-08-15 | End: 2018-08-15 | Stop reason: HOSPADM

## 2018-08-15 RX ORDER — MEPERIDINE HYDROCHLORIDE 50 MG/ML
12.5 INJECTION INTRAMUSCULAR; INTRAVENOUS; SUBCUTANEOUS ONCE AS NEEDED
Status: DISCONTINUED | OUTPATIENT
Start: 2018-08-15 | End: 2018-08-15 | Stop reason: HOSPADM

## 2018-08-15 RX ORDER — IBUPROFEN 600 MG/1
600 TABLET ORAL EVERY 6 HOURS SCHEDULED
Status: DISCONTINUED | OUTPATIENT
Start: 2018-08-16 | End: 2018-08-16 | Stop reason: HOSPADM

## 2018-08-15 RX ORDER — ADHESIVE BANDAGE
30 BANDAGE TOPICAL DAILY PRN
Status: DISCONTINUED | OUTPATIENT
Start: 2018-08-15 | End: 2018-08-16 | Stop reason: HOSPADM

## 2018-08-15 RX ORDER — ROCURONIUM BROMIDE 10 MG/ML
INJECTION, SOLUTION INTRAVENOUS
Status: DISCONTINUED | OUTPATIENT
Start: 2018-08-15 | End: 2018-08-15

## 2018-08-15 RX ORDER — NEOSTIGMINE METHYLSULFATE 1 MG/ML
INJECTION, SOLUTION INTRAVENOUS
Status: DISCONTINUED | OUTPATIENT
Start: 2018-08-15 | End: 2018-08-15

## 2018-08-15 RX ORDER — DOCUSATE SODIUM 100 MG/1
200 CAPSULE, LIQUID FILLED ORAL 2 TIMES DAILY PRN
Status: DISCONTINUED | OUTPATIENT
Start: 2018-08-15 | End: 2018-08-16 | Stop reason: HOSPADM

## 2018-08-15 RX ORDER — SODIUM CHLORIDE, SODIUM LACTATE, POTASSIUM CHLORIDE, CALCIUM CHLORIDE 600; 310; 30; 20 MG/100ML; MG/100ML; MG/100ML; MG/100ML
1000 INJECTION, SOLUTION INTRAVENOUS ONCE
Status: DISCONTINUED | OUTPATIENT
Start: 2018-08-15 | End: 2018-08-15 | Stop reason: HOSPADM

## 2018-08-15 RX ORDER — CEFAZOLIN SODIUM 2 G/50ML
2 SOLUTION INTRAVENOUS
Status: COMPLETED | OUTPATIENT
Start: 2018-08-15 | End: 2018-08-15

## 2018-08-15 RX ORDER — LIDOCAINE HYDROCHLORIDE 10 MG/ML
1 INJECTION, SOLUTION EPIDURAL; INFILTRATION; INTRACAUDAL; PERINEURAL ONCE
Status: DISCONTINUED | OUTPATIENT
Start: 2018-08-15 | End: 2018-08-15 | Stop reason: HOSPADM

## 2018-08-15 RX ORDER — PROPOFOL 10 MG/ML
VIAL (ML) INTRAVENOUS CONTINUOUS PRN
Status: DISCONTINUED | OUTPATIENT
Start: 2018-08-15 | End: 2018-08-15

## 2018-08-15 RX ORDER — DEXAMETHASONE SODIUM PHOSPHATE 4 MG/ML
INJECTION, SOLUTION INTRA-ARTICULAR; INTRALESIONAL; INTRAMUSCULAR; INTRAVENOUS; SOFT TISSUE
Status: DISCONTINUED | OUTPATIENT
Start: 2018-08-15 | End: 2018-08-15

## 2018-08-15 RX ORDER — MIDAZOLAM HYDROCHLORIDE 1 MG/ML
INJECTION, SOLUTION INTRAMUSCULAR; INTRAVENOUS
Status: DISCONTINUED | OUTPATIENT
Start: 2018-08-15 | End: 2018-08-15

## 2018-08-15 RX ORDER — ONDANSETRON 2 MG/ML
INJECTION INTRAMUSCULAR; INTRAVENOUS
Status: DISCONTINUED | OUTPATIENT
Start: 2018-08-15 | End: 2018-08-15

## 2018-08-15 RX ORDER — PROPOFOL 10 MG/ML
VIAL (ML) INTRAVENOUS
Status: DISCONTINUED | OUTPATIENT
Start: 2018-08-15 | End: 2018-08-15

## 2018-08-15 RX ORDER — METOCLOPRAMIDE HYDROCHLORIDE 5 MG/ML
10 INJECTION INTRAMUSCULAR; INTRAVENOUS EVERY 10 MIN PRN
Status: DISCONTINUED | OUTPATIENT
Start: 2018-08-15 | End: 2018-08-15 | Stop reason: HOSPADM

## 2018-08-15 RX ORDER — PROPRANOLOL HYDROCHLORIDE 40 MG/1
40 TABLET ORAL 2 TIMES DAILY
Status: DISCONTINUED | OUTPATIENT
Start: 2018-08-15 | End: 2018-08-16 | Stop reason: HOSPADM

## 2018-08-15 RX ORDER — ZOLPIDEM TARTRATE 5 MG/1
5 TABLET ORAL NIGHTLY PRN
Status: DISCONTINUED | OUTPATIENT
Start: 2018-08-15 | End: 2018-08-16 | Stop reason: HOSPADM

## 2018-08-15 RX ORDER — GLYCOPYRROLATE 0.2 MG/ML
INJECTION INTRAMUSCULAR; INTRAVENOUS
Status: DISCONTINUED | OUTPATIENT
Start: 2018-08-15 | End: 2018-08-15

## 2018-08-15 RX ORDER — HYDROMORPHONE HYDROCHLORIDE 2 MG/ML
4 INJECTION, SOLUTION INTRAMUSCULAR; INTRAVENOUS; SUBCUTANEOUS ONCE
Status: DISCONTINUED | OUTPATIENT
Start: 2018-08-15 | End: 2018-08-15

## 2018-08-15 RX ORDER — ACETAMINOPHEN 10 MG/ML
INJECTION, SOLUTION INTRAVENOUS
Status: DISCONTINUED | OUTPATIENT
Start: 2018-08-15 | End: 2018-08-15

## 2018-08-15 RX ADMIN — ROCURONIUM BROMIDE 5 MG: 10 INJECTION, SOLUTION INTRAVENOUS at 11:08

## 2018-08-15 RX ADMIN — GLYCOPYRROLATE 0.5 MG: 0.2 INJECTION, SOLUTION INTRAMUSCULAR; INTRAVENOUS at 11:08

## 2018-08-15 RX ADMIN — FENTANYL CITRATE 50 MCG: 50 INJECTION INTRAMUSCULAR; INTRAVENOUS at 10:08

## 2018-08-15 RX ADMIN — HYDROMORPHONE HYDROCHLORIDE 0.2 MG: 2 INJECTION, SOLUTION INTRAMUSCULAR; INTRAVENOUS; SUBCUTANEOUS at 12:08

## 2018-08-15 RX ADMIN — FENTANYL CITRATE 50 MCG: 50 INJECTION INTRAMUSCULAR; INTRAVENOUS at 11:08

## 2018-08-15 RX ADMIN — FENTANYL CITRATE 100 MCG: 50 INJECTION INTRAMUSCULAR; INTRAVENOUS at 10:08

## 2018-08-15 RX ADMIN — PROPRANOLOL HYDROCHLORIDE 40 MG: 40 TABLET ORAL at 08:08

## 2018-08-15 RX ADMIN — LIDOCAINE HYDROCHLORIDE 100 MG: 20 INJECTION, SOLUTION INTRAVENOUS at 10:08

## 2018-08-15 RX ADMIN — HYDROMORPHONE HYDROCHLORIDE 2 MG: 2 TABLET ORAL at 05:08

## 2018-08-15 RX ADMIN — HYDROMORPHONE HYDROCHLORIDE 2 MG: 2 TABLET ORAL at 11:08

## 2018-08-15 RX ADMIN — ACETAMINOPHEN 1000 MG: 10 INJECTION, SOLUTION INTRAVENOUS at 10:08

## 2018-08-15 RX ADMIN — MIDAZOLAM HYDROCHLORIDE 2 MG: 1 INJECTION, SOLUTION INTRAMUSCULAR; INTRAVENOUS at 10:08

## 2018-08-15 RX ADMIN — SIMETHICONE CHEW TAB 80 MG 80 MG: 80 TABLET ORAL at 08:08

## 2018-08-15 RX ADMIN — DEXAMETHASONE SODIUM PHOSPHATE 4 MG: 4 INJECTION, SOLUTION INTRAMUSCULAR; INTRAVENOUS at 11:08

## 2018-08-15 RX ADMIN — MIDAZOLAM HYDROCHLORIDE 1 MG: 1 INJECTION, SOLUTION INTRAMUSCULAR; INTRAVENOUS at 11:08

## 2018-08-15 RX ADMIN — HYDROMORPHONE HYDROCHLORIDE 0.2 MG: 2 INJECTION, SOLUTION INTRAMUSCULAR; INTRAVENOUS; SUBCUTANEOUS at 01:08

## 2018-08-15 RX ADMIN — NEOSTIGMINE METHYLSULFATE 4 MG: 1 INJECTION INTRAVENOUS at 11:08

## 2018-08-15 RX ADMIN — DOCUSATE SODIUM 200 MG: 100 CAPSULE, LIQUID FILLED ORAL at 08:08

## 2018-08-15 RX ADMIN — ACETAMINOPHEN 1000 MG: 10 INJECTION, SOLUTION INTRAVENOUS at 12:08

## 2018-08-15 RX ADMIN — MAGNESIUM HYDROXIDE 2400 MG: 400 SUSPENSION ORAL at 08:08

## 2018-08-15 RX ADMIN — METOCLOPRAMIDE 10 MG: 5 INJECTION, SOLUTION INTRAMUSCULAR; INTRAVENOUS at 10:08

## 2018-08-15 RX ADMIN — ROCURONIUM BROMIDE 50 MG: 10 INJECTION, SOLUTION INTRAVENOUS at 10:08

## 2018-08-15 RX ADMIN — PROPOFOL 150 MCG/KG/MIN: 10 INJECTION, EMULSION INTRAVENOUS at 10:08

## 2018-08-15 RX ADMIN — SODIUM CHLORIDE, SODIUM LACTATE, POTASSIUM CHLORIDE, AND CALCIUM CHLORIDE: .6; .31; .03; .02 INJECTION, SOLUTION INTRAVENOUS at 11:08

## 2018-08-15 RX ADMIN — LIDOCAINE HYDROCHLORIDE 4 ML: 20 JELLY TOPICAL at 10:08

## 2018-08-15 RX ADMIN — HYDROMORPHONE HYDROCHLORIDE 2 MG: 2 TABLET ORAL at 02:08

## 2018-08-15 RX ADMIN — IBUPROFEN 600 MG: 600 TABLET, FILM COATED ORAL at 11:08

## 2018-08-15 RX ADMIN — MIDAZOLAM HYDROCHLORIDE 1 MG: 1 INJECTION, SOLUTION INTRAMUSCULAR; INTRAVENOUS at 10:08

## 2018-08-15 RX ADMIN — OXYCODONE HYDROCHLORIDE AND ACETAMINOPHEN 1 TABLET: 5; 325 TABLET ORAL at 01:08

## 2018-08-15 RX ADMIN — SODIUM CHLORIDE, SODIUM LACTATE, POTASSIUM CHLORIDE, AND CALCIUM CHLORIDE: .6; .31; .03; .02 INJECTION, SOLUTION INTRAVENOUS at 08:08

## 2018-08-15 RX ADMIN — PROPOFOL 200 MG: 10 INJECTION, EMULSION INTRAVENOUS at 10:08

## 2018-08-15 RX ADMIN — CEFAZOLIN SODIUM 2 G: 2 SOLUTION INTRAVENOUS at 10:08

## 2018-08-15 RX ADMIN — ONDANSETRON 4 MG: 2 INJECTION, SOLUTION INTRAMUSCULAR; INTRAVENOUS at 11:08

## 2018-08-15 NOTE — BRIEF OP NOTE
OPERATIVE NOTES     Date of Procedure: 8/15/2018    Preoperative Diagnoses:  1.  Chronic pelvic pain  2.  Endometriosis  3.  Unresponsive to Medical Therapy  4.  Previous  sections x 2 with tubal ligation      Postoperative Diagnoses:  1.  Chronic pelvic pain  2.  Endometriosis  3.  Unresponsive to Medical Therapy  4.  Previous  sections x 2 with tubal ligation  5.  Pelvic Adhesions    Procedures:  1.  Total Laparoscopic Hysterectomy  2.  Bilateral Salpingectomy  3.  Adhesiolysis    Surgeon: MD Nelson  Assistant: MD Florian    Anesthesia: GETA   EBL: 100 cc    Findings:  1.  Extensive adhesions from omentum to anterior abdominal wall, from uterus to bladder  2.  Small uterus  3.  Normal tubes and ovaries    Complications:  None    Specimen:  1.  Uterus with attached cervix  2.  Bilateral segments of tubes    History:   31 yo   Long history of pelvic pain and endometriosis  Status post multiple laparoscopies with ablation of endometriosis  Admitted today for total laparoscopic hysterectomy with bilateral salpingectomy and left oophorectomy  Risks and benefits of procedures discussed  Questions answered  Consents signed  She is eager to proceed      Donny Damon MD

## 2018-08-15 NOTE — TRANSFER OF CARE
"Anesthesia Transfer of Care Note    Patient: Ruby Lebron    Procedure(s) Performed: Procedure(s) (LRB):  HYSTERECTOMY, TOTAL, LAPAROSCOPIC (N/A)    Patient location: PACU    Anesthesia Type: general    Transport from OR: Transported from OR on room air with adequate spontaneous ventilation    Post pain: adequate analgesia    Post assessment: no apparent anesthetic complications and tolerated procedure well    Post vital signs: stable    Level of consciousness: sedated and responds to stimulation    Nausea/Vomiting: no nausea/vomiting    Complications: none    Transfer of care protocol was followed      Last vitals:   Visit Vitals  /80 (BP Location: Right arm)   Pulse 79   Temp 36.7 °C (98 °F) (Oral)   Resp 17   Ht 4' 11" (1.499 m)   Wt 54.4 kg (119 lb 14 oz)   SpO2 97%   BMI 24.21 kg/m²     "

## 2018-08-15 NOTE — CARE UPDATE
Called by Nursing staff.  Re. Patient in significant pain  I came to examine patient.  Somewhat distended with minimal vaginal bleeding  Dilaudid 2 mg orally given    I came back at 1730 to re-evaluate patient.  She is feeling better  Lying in bed with heating pad  Bleeding improved  Less distention    Will keep overnight for observation  Discharge home tomorrow, 8/16/2018 with Dilaudid  Back in 2 weeks    Donny Damon MD

## 2018-08-15 NOTE — DISCHARGE SUMMARY
DISCHARGE SUMMARY    Date of Admission: 8/15/2018  Date of Discharge: 8/15/2018    Diagnoses on Discharge:   Status post total laparoscopic hysterectomy with bilateral salpingectomy    History:   33 yo   Long history of pelvic pain and endometriosis  Status post multiple laparoscopies with ablation of endometriosis  Admitted today for total laparoscopic hysterectomy with bilateral salpingectomy and left oophorectomy  Risks and benefits of procedures discussed  Questions answered  Consents signed  She is eager to proceed    OPERATIVE NOTES      Date of Procedure: 8/15/2018     Preoperative Diagnoses:  1.  Chronic pelvic pain  2.  Endometriosis  3.  Unresponsive to Medical Therapy  4.  Previous  sections x 2 with tubal ligation        Postoperative Diagnoses:  1.  Chronic pelvic pain  2.  Endometriosis  3.  Unresponsive to Medical Therapy  4.  Previous  sections x 2 with tubal ligation  5.  Pelvic Adhesions     Procedures:  1.  Total Laparoscopic Hysterectomy  2.  Bilateral Salpingectomy  3.  Adhesiolysis     Surgeon: MD Nelson  Assistant: MD Florian     Anesthesia: GETA   EBL: 100 cc     Findings:  1.  Extensive adhesions from omentum to anterior abdominal wall, from uterus to bladder  2.  Small uterus  3.  Normal tubes and ovaries     Complications:  None     Specimen:  1.  Uterus with attached cervix  2.  Bilateral segments of tubes     * Left ovary NOT removed because it did NOT appear to be abnormal.      Postoperative course was benign.  She is tolerating oral intake well.  Exam was benign with patient afebrile, vitals stable, and minimal bleeding.  Normal activities.   Regular diet  Patient discharged home on day of surgery, 8/15/2018  Discharge medications include Percocet, Motrin.  Follow-up with me in 2 weeks.    Donny Damon MD.

## 2018-08-15 NOTE — ANESTHESIA PREPROCEDURE EVALUATION
08/15/2018  Ruby Lebron is a 32 y.o., female.    Anesthesia Evaluation    I have reviewed the Patient Summary Reports.     I have reviewed the Medications.     Review of Systems  Anesthesia Hx:  No problems with previous Anesthesia    Social:  No Alcohol Use, Smoker    Cardiovascular:   Exercise tolerance: good Hypertension        Physical Exam  General:  Well nourished    Airway/Jaw/Neck:  AIRWAY FINDINGS: Normal       Dental:  DENTAL FINDINGS: Normal   Chest/Lungs:  Chest/Lungs Clear    Heart/Vascular:  Heart Findings: Normal       Mental Status:  Mental Status Findings:  Cooperative, Alert and Oriented         Anesthesia Plan  Type of Anesthesia, risks & benefits discussed:  Anesthesia Type:  general  Patient's Preference:   Intra-op Monitoring Plan: standard ASA monitors  Intra-op Monitoring Plan Comments:   Post Op Pain Control Plan: multimodal analgesia, IV/PO Opioids PRN and per primary service following discharge from PACU  Post Op Pain Control Plan Comments:   Induction:   IV  Beta Blocker:  Patient is not currently on a Beta-Blocker (No further documentation required).       Informed Consent: Patient understands risks and agrees with Anesthesia plan.  Questions answered. Anesthesia consent signed with patient.  ASA Score: 2     Day of Surgery Review of History & Physical:    H&P update referred to the surgeon.         Ready For Surgery From Anesthesia Perspective.

## 2018-08-15 NOTE — INTERVAL H&P NOTE
The patient has been examined and the H&P has been reviewed:    I concur with the findings and no changes have occurred since H&P was written.    Anesthesia/Surgery risks, benefits and alternative options discussed and understood by patient/family.          Active Hospital Problems    Diagnosis  POA    Personal history of endometriosis [Z87.42]  Not Applicable      Resolved Hospital Problems   No resolved problems to display.     33 yo   Long history of pelvic pain and endometriosis  Status post multiple laparoscopies with ablation of endometriosis  Admitted today for total laparoscopic hysterectomy with bilateral salpingectomy and left oophorectomy  Risks and benefits of procedures discussed  Questions answered  Consents signed  She is eager to proceed    Donny Damon MD

## 2018-08-15 NOTE — OP NOTE
OPERATIVE NOTES      Date of Procedure: 8/15/2018     Preoperative Diagnoses:  1.  Chronic pelvic pain  2.  Endometriosis  3.  Unresponsive to Medical Therapy  4.  Previous  sections x 2 with tubal ligation        Postoperative Diagnoses:  1.  Chronic pelvic pain  2.  Endometriosis  3.  Unresponsive to Medical Therapy  4.  Previous  sections x 2 with tubal ligation  5.  Pelvic Adhesions     Procedures:  1.  Total Laparoscopic Hysterectomy  2.  Bilateral Salpingectomy  3.  Adhesiolysis     Surgeon: MD Nelson  Assistant: MD Florian     Anesthesia: GETA   EBL: 100 cc     Findings:  1.  Extensive adhesions from omentum to anterior abdominal wall, from uterus to bladder  2.  Small uterus  3.  Normal tubes and ovaries     Complications:  None     Specimen:  1.  Uterus with attached cervix  2.  Bilateral segments of tubes     History:   33 yo   Long history of pelvic pain and endometriosis  Status post multiple laparoscopies with ablation of endometriosis  Admitted today for total laparoscopic hysterectomy with bilateral salpingectomy and left oophorectomy  Risks and benefits of procedures discussed  Questions answered  Consents signed  She is eager to proceed    PROCEDURES IN DETAILS    After confirming appropriate consents were signed and in chart, patient was taking to the operating room where general anesthesia was administered and found to be adequate. She was prepped and draped in the dorsal lithotomy position. A weighted sterile speculum was placed in the vagina. The anterior lip of the cervix was grasped with a single tooth tenaculum. The uterus was sounded to approximately 7 cm. A stay suture of 0-Vicryl was placed at 12 o'clock and 6 o'clock on the cervix. A JOHNATHAN manipulator was placed inside the endometrial cavity.      Gloves were changed. A 10 mm supraumbilical skin incision was made with the scalpel.  A Veress needle was inserted into the umbilicus under tenting of the anterior abdominal  wall. Placement into the peritoneal cavity was confirmed via saline drop test. The abdomen was insufflated to about 12-15mm Hg using Carbon dioxide. . A 10 mm trocar was advanced through this incision. Excellent hemostasis was noted. The patient was placed in deep Trendelenburg. Findings as above.  A 5 mm right lateral skin incision was made with a scalpel and a 5 mm trocar was advanced through this incision under visualization of the camera.     Significant adhesions from omentum to anterior abdominal wall noted.  The EnSeal device placed through the 5mm right lower quadrant trochar.  Meticulous dissection and lysis of adhesions performed to improve visualization.  Total extra time was about 10 minutes.    A 5 mm left lateral skin incision was made with the scalpel. A 5 mm trocar was advanced through this incision under visualization of the camera. Excellent hemostasis was noted.      Attention was turned to the left round ligament. This was cauterized and transected and continued anteriorly to create the bladder flap to the midline using the EnSeal device. Attention was turned to the left utero-ovarian ligament. This was cauterized and transected and carried down to the level of the uterine vessels. The vessels were cauterized but not transected. Attention was turned to the right round ligament. It was cauterized and transected and continued anteriorly to finish creating the bladder flap. Attention was turned to the right utero-ovarian ligament. This was cauterized and transected and carried down to the level of the uterine vessels. The vessels were cauterized but not transected. Attention was turned to the anterior portion of the cervix. The bladder was dissected off the cervix. The anterior colpotomy was created using the Harmonic spatula. This was continued to the level of the uterine vessels bilaterally. Attention was turned to the posterior portion of the uterus. The posterior colpotomy was created and  carried around to the level of the uterine vessels bilaterally. The left uterine vessels were again cauterized and transected. The anterior and posterior colpotomies were connected. The right uterine vessels were cauterized and transected. The anterior and posterior colpotomies were connected.       The uterus was removed vaginally. A moist laparotomy sponge inside of glove was placed in the vagina to maintain intraperitoneal pressure. The vaginal cuff was reapproximated in an interrupted fashion using 0-Vicryl suture using the V-Lock Endostitch.  The pelvis was copiously irrigated and suctioned. Excellent hemostasis was noted.     Attention was then turned to the tubes.  Left tube was picked up then excised using the EnSeal device.  The same procedure repeated on the right tube.  Both tubes removed through the 10 mm trochar and sent to Pathology.     Good hemostasis noted throughout.  Attention was turned to the anterior abdominal wall. The abdomen was deflated and all trocars were removed. The skin was closed with 3.0 Vicryl in a subcuticular fashion. The sponge and glove were removed. Sponge, lap, and needle counts were correct x 2. The patient was taken to the recovery room in stable condition with the colvin draining urine.

## 2018-08-16 VITALS
WEIGHT: 119.88 LBS | TEMPERATURE: 99 F | SYSTOLIC BLOOD PRESSURE: 115 MMHG | HEIGHT: 59 IN | OXYGEN SATURATION: 96 % | DIASTOLIC BLOOD PRESSURE: 68 MMHG | RESPIRATION RATE: 18 BRPM | BODY MASS INDEX: 24.17 KG/M2 | HEART RATE: 85 BPM

## 2018-08-16 PROCEDURE — 25000003 PHARM REV CODE 250: Performed by: OBSTETRICS & GYNECOLOGY

## 2018-08-16 RX ORDER — HYDROMORPHONE HYDROCHLORIDE 2 MG/1
2 TABLET ORAL EVERY 4 HOURS PRN
Qty: 20 TABLET | Refills: 0 | Status: SHIPPED | OUTPATIENT
Start: 2018-08-16 | End: 2018-08-20 | Stop reason: SDUPTHER

## 2018-08-16 RX ADMIN — IBUPROFEN 600 MG: 600 TABLET, FILM COATED ORAL at 01:08

## 2018-08-16 RX ADMIN — IBUPROFEN 600 MG: 600 TABLET, FILM COATED ORAL at 05:08

## 2018-08-16 RX ADMIN — HYDROMORPHONE HYDROCHLORIDE 2 MG: 2 TABLET ORAL at 01:08

## 2018-08-16 RX ADMIN — HYDROMORPHONE HYDROCHLORIDE 2 MG: 2 TABLET ORAL at 08:08

## 2018-08-16 RX ADMIN — HYDROMORPHONE HYDROCHLORIDE 2 MG: 2 TABLET ORAL at 03:08

## 2018-08-16 RX ADMIN — PROPRANOLOL HYDROCHLORIDE 40 MG: 40 TABLET ORAL at 08:08

## 2018-08-16 RX ADMIN — DOCUSATE SODIUM 200 MG: 100 CAPSULE, LIQUID FILLED ORAL at 08:08

## 2018-08-16 NOTE — NURSING
Patient complaining of lower abdominal discomfort. Dressings x 3 intact to abd.  Binder intact.  VSS. Medicated frequently for pain

## 2018-08-16 NOTE — NURSING
"Patients' abdomen with mild distention and c/o pain to upper outer right quad.  Patient encouraged to turn side to side. Warm pad applied to abd., pt. States "it feels better."  "

## 2018-08-16 NOTE — ANESTHESIA POSTPROCEDURE EVALUATION
"Anesthesia Post Evaluation    Patient: Ruby Lebron    Procedure(s) Performed: Procedure(s) (LRB):  HYSTERECTOMY, TOTAL, LAPAROSCOPIC (N/A)  SALPINGECTOMY, LAPAROSCOPIC (Bilateral)  LYSIS, ADHESIONS, LAPAROSCOPIC    Final Anesthesia Type: general  Patient location during evaluation: PACU  Patient participation: Yes- Able to Participate  Level of consciousness: awake and alert and oriented  Post-procedure vital signs: reviewed and stable  Pain management: adequate  Airway patency: patent  PONV status at discharge: No PONV  Anesthetic complications: no      Cardiovascular status: blood pressure returned to baseline and hemodynamically stable  Respiratory status: unassisted, spontaneous ventilation and room air  Hydration status: euvolemic  Follow-up not needed.        Visit Vitals  /68 (BP Location: Right arm, Patient Position: Lying)   Pulse 85   Temp 37.3 °C (99.1 °F) (Oral)   Resp 18   Ht 4' 11" (1.499 m)   Wt 54.4 kg (119 lb 14 oz)   SpO2 96%   Breastfeeding? No   BMI 24.21 kg/m²       Pain/Obdulia Score: Pain Assessment Performed: Yes (8/16/2018  9:10 AM)  Presence of Pain: denies (8/16/2018  9:10 AM)  Pain Rating Prior to Med Admin: 6 (8/16/2018  1:09 PM)  Pain Rating Post Med Admin: 4 (8/16/2018  6:15 AM)  Obdulia Score: 10 (8/15/2018  2:00 PM)  Modified Obdulia Score: 20 (8/15/2018  3:31 PM)        "

## 2018-08-16 NOTE — NURSING
Patient ambulated in victoria x 2 laps and continues to complain of gas pain into upper chest area and abdominal discomfort.  Dr. Damon  Notified of symptoms and orders received .

## 2018-08-16 NOTE — NURSING
Pt discharge instructions explained and peripheral IV discontinued and dressing placed once bleeding ceased. Pt's questions answered and rationale given activity explained with ambulation being very important. Pt transported to family car via wheelchair.

## 2018-08-17 ENCOUNTER — TELEPHONE (OUTPATIENT)
Dept: OBSTETRICS AND GYNECOLOGY | Facility: CLINIC | Age: 33
End: 2018-08-17

## 2018-08-17 NOTE — TELEPHONE ENCOUNTER
----- Message from Yang Rhodes sent at 8/17/2018 10:53 AM CDT -----  Contact: self  Pt would like to know if she is to take HYDROmorphone (DILAUDID) 2 MG tablet. She is asking because of the hysterectomy. Please advise if she can bathe/shower. Contact at [phone].      --------------------------------------------------------  8/17/18 @ 5914 ([facility])  CALL ATTEMPT TO PT UNSUCCESSFUL , MESSAGE LEFT FOR PT TO RETURN CALL TO OFFICE CONCERNING DILAUDID & SHOWER  -----------------------------------------  8/17/18 @ 0573 ([facility])   SPOKE WITH MS FALCON, INFORMED HER THAT SHE CAN TAKE A SHOWER BUT NOT A TUB BATH, SHE STATED SHE IS NOT TAKING THE DILAUDID AS MUCH, INSTRUCTED HER TO USE MOTRIN INSTEAD, SHE ASKED IF SHE CAN TAKE A STOOL SOFTENER , INFORMED HER THAT WOULD BE FINE AND SHE CAN ALSO TAKE A PROBIOTIC LIKE CULTURELL , TO HELP PREVENT A YEAST INFECTION, , SHE STATED THAT DR TORRES REFILLED HER BIRTH CONTROL PILLS AND DOES HE WANT HER TO TAKE THEM, INFORMED HER NOT TO TAKE THEM YET , IT IS PROBABLY A MISTAKE BUT WE WILL CHECK WITH THE  TO BE SURE    MESSAGE FORWARDED TO ZAK Alvarez

## 2018-08-20 ENCOUNTER — TELEPHONE (OUTPATIENT)
Dept: OBSTETRICS AND GYNECOLOGY | Facility: CLINIC | Age: 33
End: 2018-08-20

## 2018-08-20 DIAGNOSIS — Z09 POSTOP CHECK: Primary | ICD-10-CM

## 2018-08-20 RX ORDER — HYDROMORPHONE HYDROCHLORIDE 2 MG/1
2 TABLET ORAL EVERY 4 HOURS PRN
Qty: 8 TABLET | Refills: 0 | Status: SHIPPED | OUTPATIENT
Start: 2018-08-20 | End: 2018-09-26

## 2018-08-20 NOTE — TELEPHONE ENCOUNTER
8/20/18 @ 5788 ([facility])   SPOKE WITH MS FALCON, SHE STATED THE DILAUDID & ADVIL/MOTRIN,  HAS NOT WORKED WITH HER PAIN, CAN SHE PLEASE BE GIVEN SOMETHING ELSE , SHE IS ALLERGIC TO PERCOCET. INFORMED HER SHE CAN TRY ALEVE TO HELP WITH PAIN, INSTRUCTED HER THAT SHE MIGHT TRY USING A HEATING PAD ALSO, PT WOULD LIKE  TO CALL HER    THIS IS PREVIOUS CONVERSTION ON 8/17/18 8/17/18 @ 6332 ([facility])   SPOKE WITH MS FALCON, INFORMED HER THAT SHE CAN TAKE A SHOWER BUT NOT A TUB BATH, SHE STATED SHE IS NOT TAKING THE DILAUDID AS MUCH, INSTRUCTED HER TO USE MOTRIN INSTEAD, SHE ASKED IF SHE CAN TAKE A STOOL SOFTENER , INFORMED HER THAT WOULD BE FINE AND SHE CAN ALSO TAKE A PROBIOTIC LIKE CULTURELL , TO HELP PREVENT A YEAST INFECTION, , SHE STATED THAT DR TORRES REFILLED HER BIRTH CONTROL PILLS AND DOES HE WANT HER TO TAKE THEM, INFORMED HER NOT TO TAKE THEM YET , IT IS PROBABLY A MISTAKE BUT WE WILL CHECK WITH THE DR TO BE SURE

## 2018-08-20 NOTE — TELEPHONE ENCOUNTER
----- Message from Saba Maier MA sent at 8/20/2018  4:26 PM CDT -----  Contact: [place]- Mother  Patient is aware that Dr Damon will send RX for only 8 pain pills  ----- Message -----  From: Kassie Newell  Sent: 8/20/2018   4:14 PM  To: [facility] Staff    Pts mother is calling to speak with staff regarding pain medication. She knows we are closing soon and didn't hear back from the DrAlbin From her call this morning. Please call pt at [phone].

## 2018-08-22 ENCOUNTER — PATIENT MESSAGE (OUTPATIENT)
Dept: OBSTETRICS AND GYNECOLOGY | Facility: CLINIC | Age: 33
End: 2018-08-22

## 2018-08-22 NOTE — TELEPHONE ENCOUNTER
Called patient on 8/22/2018 at 1222 to see how she was doing. No one answered her phone.  [phone]

## 2018-08-23 ENCOUNTER — TELEPHONE (OUTPATIENT)
Dept: OBSTETRICS AND GYNECOLOGY | Facility: CLINIC | Age: 33
End: 2018-08-23

## 2018-08-23 NOTE — TELEPHONE ENCOUNTER
----- Message from Giovanni Ramirez sent at 8/23/2018  3:56 PM CDT -----  Contact: Ruby [phone]  Patient is calling to talk to the staff. She says she is having a lot of pressure in her vaginal area constantly. She reports that she is bleeding today. Please call at your earliest convenience. Patient would like to know when can she take the bandages off now or should she wait until her next appointment. Please call at your earliest convenience.    Patient will see dr Damon on tomorrow 8/24/2018 at 8:30 AM for postop check

## 2018-08-24 ENCOUNTER — OFFICE VISIT (OUTPATIENT)
Dept: OBSTETRICS AND GYNECOLOGY | Facility: CLINIC | Age: 33
End: 2018-08-24
Payer: MEDICAID

## 2018-08-24 VITALS
WEIGHT: 112.19 LBS | SYSTOLIC BLOOD PRESSURE: 154 MMHG | BODY MASS INDEX: 22.62 KG/M2 | DIASTOLIC BLOOD PRESSURE: 82 MMHG | TEMPERATURE: 99 F | HEIGHT: 59 IN

## 2018-08-24 DIAGNOSIS — Z09 POSTOP CHECK: Primary | ICD-10-CM

## 2018-08-24 PROCEDURE — 99024 POSTOP FOLLOW-UP VISIT: CPT | Mod: ,,, | Performed by: OBSTETRICS & GYNECOLOGY

## 2018-08-24 PROCEDURE — 99213 OFFICE O/P EST LOW 20 MIN: CPT | Mod: PBBFAC | Performed by: OBSTETRICS & GYNECOLOGY

## 2018-08-24 PROCEDURE — 99999 PR PBB SHADOW E&M-EST. PATIENT-LVL III: CPT | Mod: PBBFAC,,, | Performed by: OBSTETRICS & GYNECOLOGY

## 2018-08-24 NOTE — PROGRESS NOTES
"  Subjective:       Patient ID: [place] Elbert Chante Lebron is a 32 y.o. female.    Chief Complaint:  Post-op Evaluation ([facility] 08/15/18 Pt concerned about bleeding since surgery)      History of Present Illness  HPI  Ms Lebron is a 32 years old, status post total laparoscopic hysterectomy with bilateral salpingectomy on 8/15/2018.  She comes in today for exam and followup.  Patient has no current complaints except for "lots of pelvic pressure and some vaginal spotting".  No fever or chills.  No nausea or vomiting.  No diarrhea or constipation.    She has NOT resumed normal intercourse.  Patient has begun some walking for exercise. She denies having signs and symptoms of significant depression.  She is tolerating oral intake well.      GYN & OB History  No LMP recorded. Patient has had a hysterectomy.   Date of Last Pap: 2017    OB History    Para Term  AB Living   2 2 1 1   2   SAB TAB Ectopic Multiple Live Births           2      # Outcome Date GA Lbr Kelvin/2nd Weight Sex Delivery Anes PTL Lv   2 Term 09 39w0d  3.238 kg (7 lb 2.2 oz) M CS-LTranv Spinal N ANDI   1  04 34w0d  2.24 kg (4 lb 15 oz) F CS-LTranv Gen, Spinal Y ANDI      Complications: Abruptio Placenta        Past Medical History:   Diagnosis Date    Abnormal Pap smear 04/06/10    mild dysplasia    Abnormal Pap smear of cervix     Endometriosis     Hypertension        Past Surgical History:   Procedure Laterality Date    CERVICAL BIOPSY  W/ LOOP ELECTRODE EXCISION  2010    Mild dysplasia     SECTION, LOW TRANSVERSE       SECTION, LOW TRANSVERSE      PELVIC LAPAROSCOPY         Family History   Problem Relation Age of Onset    Hypertension Father     Hypertension Mother        Social History     Socioeconomic History    Marital status:      Spouse name: None    Number of children: None    Years of education: None    Highest education level: None   Social Needs    Financial resource " strain: None    Food insecurity - worry: None    Food insecurity - inability: None    Transportation needs - medical: None    Transportation needs - non-medical: None   Occupational History    None   Tobacco Use    Smoking status: Current Every Day Smoker     Packs/day: 0.50     Years: 13.00     Pack years: 6.50    Smokeless tobacco: Never Used   Substance and Sexual Activity    Alcohol use: No    Drug use: No    Sexual activity: Not Currently     Partners: Male     Birth control/protection: OCP   Other Topics Concern    None   Social History Narrative    Together since 4/2015    He is a     She is a homemaker       Current Outpatient Medications   Medication Sig Dispense Refill    HYDROmorphone (DILAUDID) 2 MG tablet Take 1 tablet (2 mg total) by mouth every 4 (four) hours as needed. 8 tablet 0    ibuprofen (ADVIL,MOTRIN) 600 MG tablet Take 1 tablet (600 mg total) by mouth every 6 (six) hours as needed for Pain. 40 tablet 1    propranolol (INDERAL) 40 mg/5 mL (8 mg/mL) Soln Take by mouth 3 (three) times daily.       No current facility-administered medications for this visit.        Review of patient's allergies indicates:   Allergen Reactions    Percocet [oxycodone-acetaminophen] Hives and Itching         Review of Systems  Review of Systems   Constitutional: Negative for activity change, appetite change, chills, fatigue, fever and unexpected weight change.   HENT: Negative for mouth sores.    Respiratory: Negative for cough, shortness of breath and wheezing.    Cardiovascular: Negative for chest pain and palpitations.   Gastrointestinal: Positive for abdominal pain. Negative for bloating, blood in stool, constipation, nausea and vomiting.        Abdominal pressure   Endocrine: Negative for diabetes and hot flashes.   Genitourinary: Positive for pelvic pain and vaginal bleeding. Negative for dyspareunia, dysuria, frequency, hematuria, menorrhagia, menstrual problem, urgency, vaginal  discharge, vaginal pain, dysmenorrhea, urinary incontinence, postcoital bleeding and vaginal odor.   Musculoskeletal: Negative for back pain and myalgias.   Skin:  Negative for rash.   Neurological: Negative for seizures and headaches.   Psychiatric/Behavioral: Negative for depression and sleep disturbance. The patient is not nervous/anxious.    Breast: Negative for breast mass, breast pain and nipple discharge          Objective:    Physical Exam:   Constitutional: She appears well-developed and well-nourished. No distress.    HENT:   Head: Normocephalic and atraumatic.    Eyes: EOM are normal.    Neck: Normal range of motion.     Pulmonary/Chest: Effort normal. No respiratory distress.        Abdominal: Soft. She exhibits no distension. There is no tenderness. There is no rebound and no guarding.   Trochar sites healed well.  No evidence of infection     Genitourinary: Vaginal discharge found.   Genitourinary Comments: Vulva without any obvious lesions.  Vaginal vault with good support.  Minimal brownish red discharge noted.  No obvious lesion.  Cervix and Uterus are surgically absent.  Adnexa is without any masses or tenderness.           Musculoskeletal: Normal range of motion.       Neurological: She is alert.    Skin: Skin is warm and dry.    Psychiatric: She has a normal mood and affect.          Assessment:        1. Postop check              Plan:      I have discussed with the patient her condition.  She is doing well with respect to surgery.  She will continue to keep her incision clean and dry to promote proper healing.  She will be back in 4 weeks for follow-up.  She can come back sooner if she needs us.

## 2018-09-21 ENCOUNTER — OFFICE VISIT (OUTPATIENT)
Dept: URGENT CARE | Facility: CLINIC | Age: 33
End: 2018-09-21
Payer: MEDICAID

## 2018-09-21 VITALS
RESPIRATION RATE: 16 BRPM | TEMPERATURE: 98 F | HEART RATE: 91 BPM | WEIGHT: 115 LBS | OXYGEN SATURATION: 98 % | HEIGHT: 58 IN | SYSTOLIC BLOOD PRESSURE: 135 MMHG | DIASTOLIC BLOOD PRESSURE: 95 MMHG | BODY MASS INDEX: 24.14 KG/M2

## 2018-09-21 DIAGNOSIS — T78.40XA ALLERGIC REACTION, INITIAL ENCOUNTER: Primary | ICD-10-CM

## 2018-09-21 PROCEDURE — 99203 OFFICE O/P NEW LOW 30 MIN: CPT | Mod: S$GLB,,, | Performed by: FAMILY MEDICINE

## 2018-09-21 RX ORDER — HYDROXYZINE HYDROCHLORIDE 25 MG/1
25 TABLET, FILM COATED ORAL EVERY 4 HOURS PRN
Qty: 30 TABLET | Refills: 0 | Status: SHIPPED | OUTPATIENT
Start: 2018-09-21 | End: 2019-07-30

## 2018-09-21 RX ORDER — PREDNISONE 20 MG/1
80 TABLET ORAL DAILY
Qty: 12 TABLET | Refills: 0 | Status: SHIPPED | OUTPATIENT
Start: 2018-09-21 | End: 2018-09-24

## 2018-09-22 NOTE — PROGRESS NOTES
"Subjective:       Patient ID: Ruby Lebron is a 33 y.o. female.    Vitals:  height is 4' 10" (1.473 m) and weight is 52.2 kg (115 lb). Her oral temperature is 98.4 °F (36.9 °C). Her blood pressure is 135/95 (abnormal) and her pulse is 91. Her respiration is 16 and oxygen saturation is 98%.         Chief Complaint: Rash (whole body rash )    Pt c/o whole body rash, x 3 days. Stood in a wedding Saturday and Monday noticed bumps, Tuesday started itching (shoulders) then Wednesday noticed it was worse and all over body took benadryl - no relief.       Rash   This is a new problem. The current episode started in the past 7 days. The problem is unchanged. The rash is characterized by itchiness and redness. She was exposed to nothing. Pertinent negatives include no diarrhea (x 1day), fever, joint pain, shortness of breath, sore throat or vomiting. Past treatments include antihistamine. The treatment provided moderate relief.     Review of Systems   Constitution: Negative for chills and fever.   HENT: Negative for sore throat.    Eyes: Negative for blurred vision.   Cardiovascular: Negative for chest pain.   Respiratory: Negative for shortness of breath.    Skin: Positive for rash (x 3 days).   Musculoskeletal: Negative for back pain and joint pain.   Gastrointestinal: Positive for abdominal pain. Negative for diarrhea (x 1day), nausea and vomiting.   Neurological: Negative for headaches.   Psychiatric/Behavioral: The patient is not nervous/anxious.        Objective:      Physical Exam   Constitutional: She is oriented to person, place, and time. She appears well-developed and well-nourished. She is cooperative.  Non-toxic appearance. She does not appear ill. No distress.   HENT:   Head: Normocephalic and atraumatic.   Right Ear: Hearing, tympanic membrane, external ear and ear canal normal.   Left Ear: Hearing, tympanic membrane, external ear and ear canal normal.   Nose: Nose normal. No mucosal edema, " rhinorrhea or nasal deformity. No epistaxis. Right sinus exhibits no maxillary sinus tenderness and no frontal sinus tenderness. Left sinus exhibits no maxillary sinus tenderness and no frontal sinus tenderness.   Mouth/Throat: Uvula is midline, oropharynx is clear and moist and mucous membranes are normal. No trismus in the jaw. Normal dentition. No uvula swelling. No posterior oropharyngeal erythema.   Eyes: Conjunctivae and lids are normal. No scleral icterus.   Sclera clear bilat   Neck: Trachea normal, full passive range of motion without pain and phonation normal. Neck supple.   Cardiovascular: Normal rate, regular rhythm, normal heart sounds, intact distal pulses and normal pulses.   Pulmonary/Chest: Effort normal and breath sounds normal. No respiratory distress.   Abdominal: Soft. Normal appearance and bowel sounds are normal. She exhibits no distension. There is no tenderness.   Musculoskeletal: Normal range of motion. She exhibits no edema or deformity.   Neurological: She is alert and oriented to person, place, and time. She exhibits normal muscle tone. Coordination normal.   Skin: Skin is warm, dry and intact. She is not diaphoretic. No pallor.   Psychiatric: She has a normal mood and affect. Her speech is normal and behavior is normal. Judgment and thought content normal. Cognition and memory are normal.   Nursing note and vitals reviewed.      Assessment:       1. Allergic reaction, initial encounter        Plan:         Allergic reaction, initial encounter  -     hydrOXYzine HCl (ATARAX) 25 MG tablet; Take 1 tablet (25 mg total) by mouth every 4 (four) hours as needed for Itching.  Dispense: 30 tablet; Refill: 0  -     predniSONE (DELTASONE) 20 MG tablet; Take 4 tablets (80 mg total) by mouth once daily. for 3 days  Dispense: 12 tablet; Refill: 0          Patient Instructions   If you were prescribed a narcotic or controlled medication, do not drive or operate heavy equipment or machinery while  taking these medications.  You must understand that you've received an Urgent Care treatment only and that you may be released before all your medical problems are known or treated. You, the patient, will arrange for follow up care as instructed.  Follow up with your PCP or specialty clinic as directed in the next 1-2 weeks if not improved or as needed.  You can call [phone] to schedule an appointment with the appropriate provider.  If your condition worsens we recommend that you receive another evaluation at the emergency room immediately or contact your primary medical clinics after hours call service to discuss your concerns.  Please return here or go to the Emergency Department for any concerns or worsening of condition.  Please drink plenty of fluids. Please get plenty of rest.    Please return here or go to the Emergency Department for any concerns or worsening of condition.    Please take over the counter Pepcid or Zantac as directed for the next 24-72hours as needed.    If you were given a steroid shot in the clinic and have also been given a prescription for a steroid such as Prednisone or a Medrol Dose Pack, please begin taking them tomorrow. If you received a steroid shot today - this can elevate your blood pressure, elevate your blood sugar, water weight gain, nervous energy, redness to the face and dimpling of the skin where the shot goes in.     If you have a localized reaction it is ok to apply OTC  topical creams (e.g. Cortaid) as directed to the affected area. You can also use a cool compress to reduce itching.     Please take Claritin or Zyrtec or Allegra (24 hours) twice a day.  You can add Benadryl or Hydroxyzine as needed for itching, however these may make you drowsy, so do not  drive or operate heavy equipment or machinery while taking these medications.      In the future make sure to keep benadryl with you or an Epi-pen if you were prescribed one.

## 2018-09-22 NOTE — PATIENT INSTRUCTIONS
If you were prescribed a narcotic or controlled medication, do not drive or operate heavy equipment or machinery while taking these medications.  You must understand that you've received an Urgent Care treatment only and that you may be released before all your medical problems are known or treated. You, the patient, will arrange for follow up care as instructed.  Follow up with your PCP or specialty clinic as directed in the next 1-2 weeks if not improved or as needed.  You can call [phone] to schedule an appointment with the appropriate provider.  If your condition worsens we recommend that you receive another evaluation at the emergency room immediately or contact your primary medical clinics after hours call service to discuss your concerns.  Please return here or go to the Emergency Department for any concerns or worsening of condition.  Please drink plenty of fluids. Please get plenty of rest.    Please return here or go to the Emergency Department for any concerns or worsening of condition.    Please take over the counter Pepcid or Zantac as directed for the next 24-72hours as needed.    If you were given a steroid shot in the clinic and have also been given a prescription for a steroid such as Prednisone or a Medrol Dose Pack, please begin taking them tomorrow. If you received a steroid shot today - this can elevate your blood pressure, elevate your blood sugar, water weight gain, nervous energy, redness to the face and dimpling of the skin where the shot goes in.     If you have a localized reaction it is ok to apply OTC  topical creams (e.g. Cortaid) as directed to the affected area. You can also use a cool compress to reduce itching.     Please take Claritin or Zyrtec or Allegra (24 hours) twice a day.  You can add Benadryl or Hydroxyzine as needed for itching, however these may make you drowsy, so do not  drive or operate heavy equipment or machinery while taking these medications.      In the  future make sure to keep benadryl with you or an Epi-pen if you were prescribed one.

## 2018-09-26 ENCOUNTER — OFFICE VISIT (OUTPATIENT)
Dept: OBSTETRICS AND GYNECOLOGY | Facility: CLINIC | Age: 33
End: 2018-09-26
Payer: MEDICAID

## 2018-09-26 VITALS
DIASTOLIC BLOOD PRESSURE: 70 MMHG | SYSTOLIC BLOOD PRESSURE: 118 MMHG | HEIGHT: 58 IN | WEIGHT: 115.06 LBS | BODY MASS INDEX: 24.15 KG/M2

## 2018-09-26 DIAGNOSIS — Z09 POSTOP CHECK: Primary | ICD-10-CM

## 2018-09-26 DIAGNOSIS — Z90.710 STATUS POST LAPAROSCOPIC HYSTERECTOMY: ICD-10-CM

## 2018-09-26 PROCEDURE — 99213 OFFICE O/P EST LOW 20 MIN: CPT | Mod: PBBFAC | Performed by: OBSTETRICS & GYNECOLOGY

## 2018-09-26 PROCEDURE — 99999 PR PBB SHADOW E&M-EST. PATIENT-LVL III: CPT | Mod: PBBFAC,,, | Performed by: OBSTETRICS & GYNECOLOGY

## 2018-09-26 PROCEDURE — 99024 POSTOP FOLLOW-UP VISIT: CPT | Mod: ,,, | Performed by: OBSTETRICS & GYNECOLOGY

## 2018-09-26 NOTE — PROGRESS NOTES
"  Subjective:       Patient ID: [place] Elbert Lebron is a 33 y.o. female.    Chief Complaint:  Post-op Evaluation (6 wks po [facility]  on 8/15/18)      History of Present Illness  HPI  Ms Lebron is a 32 years old, status post total laparoscopic hysterectomy with bilateral salpingectomy on 8/15/2018.  She comes in today for exam and followup.  Patient has no current complaints except for "lots of pelvic pressure and some vaginal spotting".  No fever or chills.  No nausea or vomiting.  No diarrhea or constipation.    She has NOT resumed normal intercourse.  Patient has begun some walking for exercise. She denies having signs and symptoms of significant depression.  She is tolerating oral intake well.      GYN & OB History  Patient's last menstrual period was 2018 (exact date).   Date of Last Pap: 2017    OB History    Para Term  AB Living   2 2 1 1   2   SAB TAB Ectopic Multiple Live Births           2      # Outcome Date GA Lbr Kelvin/2nd Weight Sex Delivery Anes PTL Lv   2 Term 09 39w0d  3.238 kg (7 lb 2.2 oz) M CS-LTranv Spinal N ANDI   1  04 34w0d  2.24 kg (4 lb 15 oz) F CS-LTranv Gen, Spinal Y ANDI      Complications: Abruptio Placenta        Past Medical History:   Diagnosis Date    Abnormal Pap smear 04/06/10    mild dysplasia    Abnormal Pap smear of cervix     Endometriosis     Hypertension        Past Surgical History:   Procedure Laterality Date    CERVICAL BIOPSY  W/ LOOP ELECTRODE EXCISION  2010    Mild dysplasia     SECTION, LOW TRANSVERSE       SECTION, LOW TRANSVERSE      HYSTERECTOMY      HYSTERECTOMY, TOTAL, LAPAROSCOPIC N/A 8/15/2018    Performed by Donny Damon [facility] at [facility] OR    LAPAROSCOPIC LYSIS OF ADHESIONS  8/15/2018    Procedure: LYSIS, ADHESIONS, LAPAROSCOPIC;  Surgeon: Donny Damon MD;  Location: [facility] OR;  Service: OB/GYN;;    LAPAROSCOPIC SALPINGECTOMY Bilateral 8/15/2018    Procedure: SALPINGECTOMY, LAPAROSCOPIC;  Surgeon: " Donny Torres MD;  Location: [facility] OR;  Service: OB/GYN;  Laterality: Bilateral;    LAPAROSCOPIC TOTAL HYSTERECTOMY N/A 8/15/2018    Procedure: HYSTERECTOMY, TOTAL, LAPAROSCOPIC;  Surgeon: Donny Torres MD;  Location: [facility] OR;  Service: OB/GYN;  Laterality: N/A;  TF DR NGUYEN PER DR TORRES  RN PRE OP 8-10-18    LYSIS, ADHESIONS, LAPAROSCOPIC  8/15/2018    Performed by Donny Torres MD at [facility] OR    PELVIC LAPAROSCOPY      SALPINGECTOMY, LAPAROSCOPIC Bilateral 8/15/2018    Performed by Donny Torres MD at [facility] OR       Family History   Problem Relation Age of Onset    Hypertension Father     Hypertension Mother        Social History     Socioeconomic History    Marital status:      Spouse name: None    Number of children: None    Years of education: None    Highest education level: None   Social Needs    Financial resource strain: None    Food insecurity - worry: None    Food insecurity - inability: None    Transportation needs - medical: None    Transportation needs - non-medical: None   Occupational History    None   Tobacco Use    Smoking status: Current Every Day Smoker     Packs/day: 0.50     Years: 13.00     Pack years: 6.50    Smokeless tobacco: Never Used   Substance and Sexual Activity    Alcohol use: No    Drug use: No    Sexual activity: Not Currently     Partners: Male     Birth control/protection: OCP   Other Topics Concern    None   Social History Narrative    Together since 4/2015    He is a     She is a homemaker       Current Outpatient Medications   Medication Sig Dispense Refill    propranolol (INDERAL) 40 mg/5 mL (8 mg/mL) Soln Take by mouth 3 (three) times daily.      hydrOXYzine HCl (ATARAX) 25 MG tablet Take 1 tablet (25 mg total) by mouth every 4 (four) hours as needed for Itching. 30 tablet 0     No current facility-administered medications for this visit.        Review of patient's allergies indicates:   Allergen Reactions    Percocet  [oxycodone-acetaminophen] Hives and Itching        Review of Systems  Review of Systems   Constitutional: Negative for activity change, appetite change, chills, fatigue, fever and unexpected weight change.   HENT: Negative for mouth sores.    Respiratory: Negative for cough, shortness of breath and wheezing.    Cardiovascular: Negative for chest pain and palpitations.   Gastrointestinal: Positive for abdominal pain. Negative for bloating, blood in stool, constipation, nausea and vomiting.   Endocrine: Negative for diabetes and hot flashes.   Genitourinary: Positive for pelvic pain. Negative for dyspareunia, dysuria, frequency, hematuria, menorrhagia, menstrual problem, urgency, vaginal bleeding, vaginal discharge, vaginal pain, dysmenorrhea, urinary incontinence, postcoital bleeding and vaginal odor.   Musculoskeletal: Negative for back pain and myalgias.   Skin:  Negative for rash.   Neurological: Negative for seizures and headaches.   Psychiatric/Behavioral: Negative for depression and sleep disturbance. The patient is not nervous/anxious.    Breast: Negative for breast mass, breast pain and nipple discharge          Objective:    Physical Exam:   Constitutional: She appears well-developed and well-nourished. No distress.    HENT:   Head: Normocephalic and atraumatic.    Eyes: EOM are normal.    Neck: Normal range of motion.     Pulmonary/Chest: Effort normal. No respiratory distress.        Abdominal: Soft. She exhibits no distension. There is no tenderness. There is no rebound and no guarding.     Genitourinary: Vagina normal. No vaginal discharge found.   Genitourinary Comments: Vulva without any obvious lesions.  Vaginal vault with good support.  Minimal white discharge noted.  No obvious lesion.  Vaginal cuff intact and well-supported.  Cervix and Uterus are surgically absent.  Adnexa is without any masses or tenderness.           Musculoskeletal: Normal range of motion.       Neurological: She is alert.     Skin: Skin is warm and dry.    Psychiatric: She has a normal mood and affect.          Assessment:        1. Postop check    2. Status post laparoscopic hysterectomy              Plan:      I have discussed with the patient regarding her condition  She is doing well recovering from her surgery    Back next year for her annual visit.

## 2019-03-24 ENCOUNTER — OFFICE VISIT (OUTPATIENT)
Dept: URGENT CARE | Facility: CLINIC | Age: 34
End: 2019-03-24
Payer: MEDICAID

## 2019-03-24 VITALS
TEMPERATURE: 99 F | RESPIRATION RATE: 16 BRPM | BODY MASS INDEX: 24.14 KG/M2 | WEIGHT: 115 LBS | OXYGEN SATURATION: 98 % | SYSTOLIC BLOOD PRESSURE: 127 MMHG | DIASTOLIC BLOOD PRESSURE: 78 MMHG | HEIGHT: 58 IN | HEART RATE: 58 BPM

## 2019-03-24 DIAGNOSIS — J32.9 BACTERIAL SINUSITIS: Primary | ICD-10-CM

## 2019-03-24 DIAGNOSIS — R53.83 MALAISE AND FATIGUE: ICD-10-CM

## 2019-03-24 DIAGNOSIS — R53.81 MALAISE AND FATIGUE: ICD-10-CM

## 2019-03-24 DIAGNOSIS — J02.9 PHARYNGITIS, UNSPECIFIED ETIOLOGY: ICD-10-CM

## 2019-03-24 DIAGNOSIS — B96.89 BACTERIAL SINUSITIS: Primary | ICD-10-CM

## 2019-03-24 LAB
CTP QC/QA: YES
CTP QC/QA: YES
FLUAV AG NPH QL: NEGATIVE
FLUBV AG NPH QL: NEGATIVE
S PYO RRNA THROAT QL PROBE: NEGATIVE

## 2019-03-24 PROCEDURE — 99214 OFFICE O/P EST MOD 30 MIN: CPT | Mod: S$GLB,,, | Performed by: PHYSICIAN ASSISTANT

## 2019-03-24 PROCEDURE — 87880 POCT RAPID STREP A: ICD-10-PCS | Mod: QW,S$GLB,, | Performed by: PHYSICIAN ASSISTANT

## 2019-03-24 PROCEDURE — 87880 STREP A ASSAY W/OPTIC: CPT | Mod: QW,S$GLB,, | Performed by: PHYSICIAN ASSISTANT

## 2019-03-24 PROCEDURE — 87804 INFLUENZA ASSAY W/OPTIC: CPT | Mod: QW,S$GLB,, | Performed by: PHYSICIAN ASSISTANT

## 2019-03-24 PROCEDURE — 87804 POCT INFLUENZA A/B: ICD-10-PCS | Mod: 59,QW,S$GLB, | Performed by: PHYSICIAN ASSISTANT

## 2019-03-24 PROCEDURE — 99214 PR OFFICE/OUTPT VISIT, EST, LEVL IV, 30-39 MIN: ICD-10-PCS | Mod: S$GLB,,, | Performed by: PHYSICIAN ASSISTANT

## 2019-03-24 RX ORDER — AMOXICILLIN AND CLAVULANATE POTASSIUM 875; 125 MG/1; MG/1
1 TABLET, FILM COATED ORAL 2 TIMES DAILY
Qty: 20 TABLET | Refills: 0 | Status: SHIPPED | OUTPATIENT
Start: 2019-03-24 | End: 2019-04-03

## 2019-03-24 RX ORDER — PREDNISONE 20 MG/1
20 TABLET ORAL DAILY
Qty: 3 TABLET | Refills: 0 | Status: SHIPPED | OUTPATIENT
Start: 2019-03-24 | End: 2019-03-27

## 2019-03-24 RX ORDER — CODEINE PHOSPHATE AND GUAIFENESIN 10; 100 MG/5ML; MG/5ML
5 SOLUTION ORAL 3 TIMES DAILY PRN
Qty: 180 ML | Refills: 0 | Status: SHIPPED | OUTPATIENT
Start: 2019-03-24 | End: 2019-04-03

## 2019-03-24 NOTE — PATIENT INSTRUCTIONS
Sinusitis (Antibiotic Treatment)    The sinuses are air-filled spaces within the bones of the face. They connect to the inside of the nose. Sinusitis is an inflammation of the tissue lining the sinus cavity. Sinus inflammation can occur during a cold. It can also be due to allergies to pollens and other particles in the air. Sinusitis can cause symptoms of sinus congestion and fullness. A sinus infection causes fever, headache and facial pain. There is often green or yellow drainage from the nose or into the back of the throat (post-nasal drip). You have been given antibiotics to treat this condition.  Home care:  · Take the full course of antibiotics as instructed. Do not stop taking them, even if you feel better.  · Drink plenty of water, hot tea, and other liquids. This may help thin mucus. It also may promote sinus drainage.  · Heat may help soothe painful areas of the face. Use a towel soaked in hot water. Or,  the shower and direct the hot spray onto your face. Using a vaporizer along with a menthol rub at night may also help.   · An expectorant containing guaifenesin may help thin the mucus and promote drainage from the sinuses.  · Over-the-counter decongestants may be used unless a similar medicine was prescribed. Nasal sprays work the fastest. Use one that contains phenylephrine or oxymetazoline. First blow the nose gently. Then use the spray. Do not use these medicines more often than directed on the label or symptoms may get worse. You may also use tablets containing pseudoephedrine. Avoid products that combine ingredients, because side effects may be increased. Read labels. You can also ask the pharmacist for help. (NOTE: Persons with high blood pressure should not use decongestants. They can raise blood pressure.)  · Over-the-counter antihistamines may help if allergies contributed to your sinusitis.    · Do not use nasal rinses or irrigation during an acute sinus infection, unless told to by  your health care provider. Rinsing may spread the infection to other sinuses.  · Use acetaminophen or ibuprofen to control pain, unless another pain medicine was prescribed. (If you have chronic liver or kidney disease or ever had a stomach ulcer, talk with your doctor before using these medicines. Aspirin should never be used in anyone under 18 years of age who is ill with a fever. It may cause severe liver damage.)  · Don't smoke. This can worsen symptoms.  Follow-up care  Follow up with your healthcare provider or our staff if you are not improving within the next week.  When to seek medical advice  Call your healthcare provider if any of these occur:  · Facial pain or headache becoming more severe  · Stiff neck  · Unusual drowsiness or confusion  · Swelling of the forehead or eyelids  · Vision problems, including blurred or double vision  · Fever of 100.4ºF (38ºC) or higher, or as directed by your healthcare provider  · Seizure  · Breathing problems  · Symptoms not resolving within 10 days  Date Last Reviewed: 4/13/2015  © 9235-2595 [employer]. [address] [place] [address]. All rights reserved. This information is not intended as a substitute for professional medical care. Always follow your healthcare professional's instructions.      Patient Instructions   -Below are suggestions for symptomatic relief:              -Tylenol every 4 hours OR ibuprofen every 6 hours as needed for pain/fever.              -Salt water gargles to soothe throat pain.              -Chloroseptic spray also helps to numb throat pain.              -Nasal saline spray reduces inflammation and dryness.              -Warm face compresses to help with facial sinus pain/pressure.              -Vicks vapor rub at night.              -Flonase OTC or Nasacort OTC for nasal congestion.              -Simple foods like chicken noodle soup.              -Delsym helps with coughing at night              -Zyrtec/Claritin  during the day & Benadryl at night may help with allergies.                If you DO NOT have Hypertension or any history of palpitations, it is ok to take over the counter Sudafed or Mucinex D or Allegra-D or Claritin-D or Zyrtec-D.  If you do take one of the above, it is ok to combine that with plain over the counter Mucinex or Allegra or Claritin or Zyrtec. If, for example, you are taking Zyrtec -D, you can combine that with Mucinex, but not Mucinex-D.  If you are taking Mucinex-D, you can combine that with plain Allegra or Claritin or Zyrtec.   If you DO have Hypertension or palpitations, it is safe to take Coricidin HBP for relief of sinus symptoms.    Please follow up with your Primary care provider within 2-5 days if your signs and symptoms have not resolved or worsen.     If your condition worsens or fails to improve we recommend that you receive another evaluation at the emergency room immediately or contact your primary medical clinic to discuss your concerns.   You must understand that you have received an Urgent Care treatment only and that you may be released before all of your medical problems are known or treated. You, the patient, will arrange for follow up care as instructed.     RED FLAGS/WARNING SYMPTOMS DISCUSSED WITH PATIENT THAT WOULD WARRANT EMERGENT MEDICAL ATTENTION. PATIENT VERBALIZED UNDERSTANDING.

## 2019-03-24 NOTE — PROGRESS NOTES
"Subjective:       Patient ID: Ruby Lebron is a 33 y.o. female.    Vitals:  height is 4' 10" (1.473 m) and weight is 52.2 kg (115 lb). Her oral temperature is 98.8 °F (37.1 °C). Her blood pressure is 127/78 and her pulse is 58 (abnormal). Her respiration is 16 and oxygen saturation is 98%.     Chief Complaint: Sore Throat    Rhinorrhea began Monday, Friday congestion/sore throat sx began.  Possibly exposed to strep.  Exposed to flu.    Sore Throat    This is a new problem. The current episode started in the past 7 days. The problem has been gradually worsening. Neither side of throat is experiencing more pain than the other. There has been no fever. The pain is at a severity of 7/10. The pain is moderate. Associated symptoms include congestion, coughing, headaches and a plugged ear sensation. Pertinent negatives include no abdominal pain, diarrhea, drooling, ear discharge, ear pain, hoarse voice, neck pain, shortness of breath, stridor, swollen glands, trouble swallowing or vomiting. She has had no exposure to strep or mono. Treatments tried: Dayquil. The treatment provided no relief.       Constitution: Positive for chills and fever. Negative for sweating and fatigue.   HENT: Positive for congestion, sinus pain, sinus pressure and sore throat. Negative for ear pain, ear discharge, drooling, trouble swallowing and voice change.    Neck: Negative for neck pain and painful lymph nodes.   Eyes: Positive for eye redness and eyelid swelling.   Respiratory: Positive for cough and sputum production. Negative for chest tightness, bloody sputum, COPD, shortness of breath, stridor, wheezing and asthma.    Gastrointestinal: Negative for abdominal pain, nausea, vomiting and diarrhea.   Musculoskeletal: Negative for muscle ache.   Skin: Negative for rash.   Allergic/Immunologic: Negative for seasonal allergies and asthma.   Neurological: Positive for headaches.   Hematologic/Lymphatic: Negative for swollen lymph " nodes.       Objective:      Physical Exam   Constitutional: She is oriented to person, place, and time. She appears well-developed and well-nourished. She is cooperative.  Non-toxic appearance. She does not appear ill. No distress.   HENT:   Head: Normocephalic and atraumatic.   Right Ear: Hearing, tympanic membrane, external ear and ear canal normal.   Left Ear: Hearing, tympanic membrane, external ear and ear canal normal.   Nose: Mucosal edema and sinus tenderness present. No rhinorrhea or nasal deformity. No epistaxis. Right sinus exhibits maxillary sinus tenderness. Right sinus exhibits no frontal sinus tenderness. Left sinus exhibits maxillary sinus tenderness. Left sinus exhibits no frontal sinus tenderness.   Mouth/Throat: Uvula is midline and mucous membranes are normal. No trismus in the jaw. Normal dentition. No uvula swelling. Posterior oropharyngeal erythema present. Tonsils are 1+ on the right. Tonsils are 1+ on the left. No tonsillar exudate.   Thick green PND noted   Eyes: Conjunctivae and lids are normal. No scleral icterus.   Sclera clear bilat   Neck: Trachea normal, full passive range of motion without pain and phonation normal. Neck supple.   Cardiovascular: Normal rate, regular rhythm, normal heart sounds, intact distal pulses and normal pulses.   Pulmonary/Chest: Effort normal and breath sounds normal. No accessory muscle usage or stridor. No respiratory distress. She has no decreased breath sounds. She has no wheezes. She has no rhonchi. She has no rales.   Abdominal: Soft. Normal appearance and bowel sounds are normal. She exhibits no distension. There is no tenderness.   Musculoskeletal: Normal range of motion. She exhibits no edema or deformity.   Lymphadenopathy:        Head (right side): Submandibular and tonsillar adenopathy present. No submental, no preauricular, no posterior auricular and no occipital adenopathy present.        Head (left side): Submandibular and tonsillar adenopathy  present. No submental, no preauricular, no posterior auricular and no occipital adenopathy present.     She has cervical adenopathy.        Right cervical: Superficial cervical adenopathy present.        Left cervical: Superficial cervical adenopathy present.   Neurological: She is alert and oriented to person, place, and time. She exhibits normal muscle tone. Coordination normal.   Skin: Skin is warm, dry and intact. She is not diaphoretic. No pallor.   Psychiatric: She has a normal mood and affect. Her speech is normal and behavior is normal. Judgment and thought content normal. Cognition and memory are normal.   Nursing note and vitals reviewed.      Assessment:       1. Bacterial sinusitis    2. Malaise and fatigue    3. Pharyngitis, unspecified etiology        Plan:         Bacterial sinusitis  -     amoxicillin-clavulanate 875-125mg (AUGMENTIN) 875-125 mg per tablet; Take 1 tablet by mouth 2 (two) times daily. for 10 days  Dispense: 20 tablet; Refill: 0  -     predniSONE (DELTASONE) 20 MG tablet; Take 1 tablet (20 mg total) by mouth once daily. for 3 days  Dispense: 3 tablet; Refill: 0  -     guaifenesin-codeine 100-10 mg/5 ml (TUSSI-ORGANIDIN NR)  mg/5 mL syrup; Take 5 mLs by mouth 3 (three) times daily as needed.  Dispense: 180 mL; Refill: 0    Malaise and fatigue  -     POCT Influenza A/B  -     POCT rapid strep A    Pharyngitis, unspecified etiology          Sinusitis (Antibiotic Treatment)    The sinuses are air-filled spaces within the bones of the face. They connect to the inside of the nose. Sinusitis is an inflammation of the tissue lining the sinus cavity. Sinus inflammation can occur during a cold. It can also be due to allergies to pollens and other particles in the air. Sinusitis can cause symptoms of sinus congestion and fullness. A sinus infection causes fever, headache and facial pain. There is often green or yellow drainage from the nose or into the back of the throat (post-nasal drip).  You have been given antibiotics to treat this condition.  Home care:  · Take the full course of antibiotics as instructed. Do not stop taking them, even if you feel better.  · Drink plenty of water, hot tea, and other liquids. This may help thin mucus. It also may promote sinus drainage.  · Heat may help soothe painful areas of the face. Use a towel soaked in hot water. Or,  the shower and direct the hot spray onto your face. Using a vaporizer along with a menthol rub at night may also help.   · An expectorant containing guaifenesin may help thin the mucus and promote drainage from the sinuses.  · Over-the-counter decongestants may be used unless a similar medicine was prescribed. Nasal sprays work the fastest. Use one that contains phenylephrine or oxymetazoline. First blow the nose gently. Then use the spray. Do not use these medicines more often than directed on the label or symptoms may get worse. You may also use tablets containing pseudoephedrine. Avoid products that combine ingredients, because side effects may be increased. Read labels. You can also ask the pharmacist for help. (NOTE: Persons with high blood pressure should not use decongestants. They can raise blood pressure.)  · Over-the-counter antihistamines may help if allergies contributed to your sinusitis.    · Do not use nasal rinses or irrigation during an acute sinus infection, unless told to by your health care provider. Rinsing may spread the infection to other sinuses.  · Use acetaminophen or ibuprofen to control pain, unless another pain medicine was prescribed. (If you have chronic liver or kidney disease or ever had a stomach ulcer, talk with your doctor before using these medicines. Aspirin should never be used in anyone under 18 years of age who is ill with a fever. It may cause severe liver damage.)  · Don't smoke. This can worsen symptoms.  Follow-up care  Follow up with your healthcare provider or our staff if you are not  improving within the next week.  When to seek medical advice  Call your healthcare provider if any of these occur:  · Facial pain or headache becoming more severe  · Stiff neck  · Unusual drowsiness or confusion  · Swelling of the forehead or eyelids  · Vision problems, including blurred or double vision  · Fever of 100.4ºF (38ºC) or higher, or as directed by your healthcare provider  · Seizure  · Breathing problems  · Symptoms not resolving within 10 days  Date Last Reviewed: 4/13/2015 © 2000-2017 [employer]. [address] [place] [address], [place] [address]. All rights reserved. This information is not intended as a substitute for professional medical care. Always follow your healthcare professional's instructions.      Patient Instructions   -Below are suggestions for symptomatic relief:              -Tylenol every 4 hours OR ibuprofen every 6 hours as needed for pain/fever.              -Salt water gargles to soothe throat pain.              -Chloroseptic spray also helps to numb throat pain.              -Nasal saline spray reduces inflammation and dryness.              -Warm face compresses to help with facial sinus pain/pressure.              -Vicks vapor rub at night.              -Flonase OTC or Nasacort OTC for nasal congestion.              -Simple foods like chicken noodle soup.              -Delsym helps with coughing at night              -Zyrtec/Claritin during the day & Benadryl at night may help with allergies.                If you DO NOT have Hypertension or any history of palpitations, it is ok to take over the counter Sudafed or Mucinex D or Allegra-D or Claritin-D or Zyrtec-D.  If you do take one of the above, it is ok to combine that with plain over the counter Mucinex or Allegra or Claritin or Zyrtec. If, for example, you are taking Zyrtec -D, you can combine that with Mucinex, but not Mucinex-D.  If you are taking Mucinex-D, you can combine that with plain Allegra or Claritin or  Zyrtec.   If you DO have Hypertension or palpitations, it is safe to take Coricidin HBP for relief of sinus symptoms.    Please follow up with your Primary care provider within 2-5 days if your signs and symptoms have not resolved or worsen.     If your condition worsens or fails to improve we recommend that you receive another evaluation at the emergency room immediately or contact your primary medical clinic to discuss your concerns.   You must understand that you have received an Urgent Care treatment only and that you may be released before all of your medical problems are known or treated. You, the patient, will arrange for follow up care as instructed.     RED FLAGS/WARNING SYMPTOMS DISCUSSED WITH PATIENT THAT WOULD WARRANT EMERGENT MEDICAL ATTENTION. PATIENT VERBALIZED UNDERSTANDING.

## 2019-03-24 NOTE — LETTER
March 24, 2019      [facility] [place]  [place] [address]  Phone: [phone]  Fax: [phone]       Patient: Ruby Lebron   YOB: 1985  Date of Visit: 03/24/2019    To Whom It May Concern:    Riri Lebron  was at [facility] on 03/24/2019. She may return to work/school on 3/26/18 with no restrictions. If you have any questions or concerns, or if I can be of further assistance, please do not hesitate to contact me.    Sincerely,    Geri Elias MA

## 2019-07-29 ENCOUNTER — TELEPHONE (OUTPATIENT)
Dept: OTOLARYNGOLOGY | Facility: CLINIC | Age: 34
End: 2019-07-29

## 2019-07-29 DIAGNOSIS — N64.3 GALACTORRHEA: Primary | ICD-10-CM

## 2019-07-29 NOTE — TELEPHONE ENCOUNTER
Patient called to talk to our nurse.  Re Breast discharge of white fluid.    Will order prolactin level.  Back in 2 weeks.

## 2019-07-29 NOTE — TELEPHONE ENCOUNTER
----- Message from Elle Mathews sent at 7/29/2019 10:07 AM CDT -----  Contact: Self  Type: Patient Call Back    Who called:Self  What is the request in detail: Patient called stating that her breast is leaking a white fluid with pain. Patient also stated that she has cyst from previous Mammogram.    Can the clinic reply by [facility]? no  Would the patient rather a call back or a response via [facility]? call    Best call back number:[phone]

## 2019-07-30 ENCOUNTER — HOSPITAL ENCOUNTER (EMERGENCY)
Facility: HOSPITAL | Age: 34
Discharge: HOME | End: 2019-07-30
Attending: EMERGENCY MEDICINE
Payer: MEDICAID

## 2019-07-30 VITALS
BODY MASS INDEX: 22.58 KG/M2 | RESPIRATION RATE: 15 BRPM | HEART RATE: 116 BPM | OXYGEN SATURATION: 98 % | WEIGHT: 115 LBS | DIASTOLIC BLOOD PRESSURE: 86 MMHG | HEIGHT: 60 IN | SYSTOLIC BLOOD PRESSURE: 134 MMHG | TEMPERATURE: 99 F

## 2019-07-30 DIAGNOSIS — N64.4 BREAST PAIN: Primary | ICD-10-CM

## 2019-07-30 PROCEDURE — 99281 EMR DPT VST MAYX REQ PHY/QHP: CPT

## 2019-07-30 PROCEDURE — 99284 PR EMERGENCY DEPT VISIT,LEVEL IV: ICD-10-PCS | Mod: ,,, | Performed by: EMERGENCY MEDICINE

## 2019-07-30 PROCEDURE — 99284 EMERGENCY DEPT VISIT MOD MDM: CPT | Mod: ,,, | Performed by: EMERGENCY MEDICINE

## 2019-07-30 NOTE — ED PROVIDER NOTES
Encounter Date: 2019    SCRIBE #1 NOTE: I, Evelyn Llamas, am scribing for, and in the presence of,  Kenya Sorensen MD. I have scribed the entire note.       History     Chief Complaint   Patient presents with    Breast Discharge     Time patient was seen by the provider: 5:06 PM      The patient is a 33 y.o. female with co-morbidities including: Endometriosis, HTN who presents to the ED with a complaint of breast discharge that began three days ago. Patient reports that three days ago, she squeezed her breast and noticed white and clear discharge coming from her nipples with associated swelling and tenderness. Denies erythema to breast. Denies nipples retraction. She reports it is uncomfortable to wear a bra. Patient reports contacting her PCP and her gynecologist. However the next appointment is not until 3 weeks later. Patient reports two years ago she felt as if she had knots around her breast and had a mammogram that was negative. Denies chest pain, shortness of breath, unexpected weight loss, nausea, vomiting, diarrhea, sore throat, headache, dizziness, change in vision, fever, or chills. She reports a surgical history of hysterectomy due to endometriosis. She was last pregnant 10 years ago. Denies being on hormones.     The history is provided by the patient.     Review of patient's allergies indicates:   Allergen Reactions    Percocet [oxycodone-acetaminophen] Hives and Itching     Past Medical History:   Diagnosis Date    Abnormal Pap smear 04/06/10    mild dysplasia    Abnormal Pap smear of cervix     Endometriosis     Hypertension      Past Surgical History:   Procedure Laterality Date    CERVICAL BIOPSY  W/ LOOP ELECTRODE EXCISION  2010    Mild dysplasia     SECTION, LOW TRANSVERSE       SECTION, LOW TRANSVERSE      HYSTERECTOMY      HYSTERECTOMY, TOTAL, LAPAROSCOPIC N/A 8/15/2018    [facility]    LYSIS, ADHESIONS, LAPAROSCOPIC  8/15/2018     [facility] at [facility]    PELVIC LAPAROSCOPY      SALPINGECTOMY, LAPAROSCOPIC Bilateral 8/15/2018    [facility] at [facility] OR     Family History   Problem Relation Age of Onset    Hypertension Father     Hypertension Mother      Social History     Tobacco Use    Smoking status: Current Every Day Smoker     Packs/day: 0.50     Years: 13.00     Pack years: 6.50    Smokeless tobacco: Never Used   Substance Use Topics    Alcohol use: No    Drug use: No     Review of Systems   Constitutional: Negative for fever.   HENT: Negative for sore throat.    Respiratory: Negative for shortness of breath.    Cardiovascular: Negative for chest pain.   Gastrointestinal: Negative for nausea.   Genitourinary: Negative for dysuria.   Musculoskeletal: Negative for back pain.        Breast discharge and tenderness   Skin: Negative for rash.   Neurological: Positive for headaches (or diplopia). Negative for weakness.   Hematological: Does not bruise/bleed easily.       Physical Exam     Initial Vitals [07/30/19 1629]   BP Pulse Resp Temp SpO2   134/86 (!) 116 15 98.6 °F (37 °C) 98 %      MAP       --         Physical Exam    Nursing note and vitals reviewed.  Constitutional: She appears well-developed and well-nourished.   HENT:   Mouth/Throat: Oropharynx is clear and moist.   Eyes: EOM are normal. Pupils are equal, round, and reactive to light.   Cardiovascular: Normal rate and regular rhythm.   Pulmonary/Chest: Breath sounds normal. She has no wheezes. She has no rales.   Breast appear normal bilateral with no nipple retraction or skin changes.  Unable to express discharge from either breast.  No masses of right breast.  Left breast-small mobile mass under areola with mild tenderness.  Axilla normal.     Abdominal: Soft. Bowel sounds are normal.         ED Course   Procedures  Labs Reviewed - No data to display       Imaging Results    None          Medical Decision Making:   History:   Old  "Medical Records: I decided to obtain old medical records.  Old Records Summarized: records from another hospital.       <> Summary of Records: [facility] Mamogram and US:  Chad, External Ris In - 10/12/2015 2:37 PM CDT    Impression      BILATERAL BREASTS  Negative, no evidence of malignancy. Age appropriate follow-up is  recommended. Current mammography screening guidelines recommend a  yearly exam for women over 40. No additional imaging is necessary,  unless clinical symptoms warrant are otherwise.    Bilateral Breast Findings:    No significant masses, calcifications or other abnormalities are    seen. Targeted ultrasound was performed of patient's focal palpable "    lump" in the left breast in the 11 o'clock position approximately 4    cm from the nipple.  Only normal mildly heterogeneous fibroglandular    tissue is noted within this region. No mass or other suspicious    finding evident. Mammographic and sonographic findings appear    concordant and negative. No additional imaging is necessary, unless    clinical symptoms warrant are otherwise.    Initial Assessment:   Urgent evaluation a 33-year-old female presenting with bilateral breast discharge for the past several days.  Patient states she has had similar issue several years ago with normal mammogram.  Diagnosed with fibrocystic changes.    Denies recent fevers, chills. Does report tenderness of bilateral breast.  Differential Diagnosis:   Fibrocystic changes, hormonal imbalance, breast abscess, breast CA  ED Management:  - exam is benign.  - mammogram and ultrasound reviewed from 2015 with no acute process  - recommend follow-up with PCP or breast surgery for further evaluation if symptoms do not improve            Scribe Attestation:   Scribe #1: I performed the above scribed service and the documentation accurately describes the services I performed. I attest to the accuracy of the note.    Attending Attestation:           Physician Attestation for " Scribe:      Comments: I, Dr. Kenya Sorensen, personally performed the services described in this documentation. All medical record entries made by the scribe were at my direction and in my presence.  I have reviewed the chart and agree that the record reflects my personal performance and is accurate and complete. Kenya Sorensen MD.                 Clinical Impression:       ICD-10-CM ICD-9-CM   1. Breast pain N64.4 611.71         Disposition:   Disposition: Discharged  Condition: Stable                        Kenya Sorensen MD  07/31/19 0118

## 2019-07-30 NOTE — DISCHARGE INSTRUCTIONS
Please follow up with breast surgeon or PCP for further evaluation of breast pain and drainage.   Ibuprofen/tylenol for pain as needed.      Our goal in the emergency department is to always give you outstanding care and exceptional service. You may receive a survey by mail or e-mail in the next week regarding your experience in our ED. We would greatly appreciate your completing and returning the survey. Your feedback provides us with a way to recognize our staff who give very good care and it helps us learn how to improve when your experience was below our aspiration of excellence.

## 2019-07-30 NOTE — ED NOTES
LOC: The patient is awake, alert, and oriented to place, time, situation. Affect is appropriate.  Speech is appropriate and clear.     APPEARANCE: Patient resting uncomfortably, reporting breast tenderness, swelling and discharge for the past week.  Patient is clean and well groomed.    SKIN: The skin is warm and dry; color consistent with ethnicity.  Patient has normal skin turgor and moist mucus membranes.  Skin intact; no breakdown or bruising noted.     MUSCULOSKELETAL: Patient moving upper and lower extremities without difficulty.  Denies weakness.     RESPIRATORY: Airway is open and patent. Respirations spontaneous, even, easy, and non-labored.  Patient has a normal effort and rate.  No accessory muscle use noted. Denies cough.     CARDIAC:  No peripheral edema noted. No complaints of chest pain.      ABDOMEN: Soft and non tender to palpation.  No distention noted.     NEUROLOGIC: Eyes open spontaneously.  Behavior appropriate to situation.  Follows commands; facial expression symmetrical.  Purposeful motor response noted; normal sensation in all extremities.

## 2019-08-02 ENCOUNTER — TELEPHONE (OUTPATIENT)
Dept: SURGERY | Facility: CLINIC | Age: 34
End: 2019-08-02

## 2019-08-02 NOTE — TELEPHONE ENCOUNTER
----- Message from Brandee Muhammad sent at 8/2/2019  2:16 PM CDT -----  Reason for call: Pt calling to schedule a appt for a breast cyst, she says her left breast is very sore and  painful to touch, she says she also has a discharge coming from her nipple.    Communication Preference:[phone]    Additional Information:

## 2019-08-02 NOTE — TELEPHONE ENCOUNTER
Returned the patient call regarding the message below.  The patient is scheduled to be seen on Monday 8/5/19 at 1:30 pm with Rosemarie Torrez PA-C.  The patient voiced understanding of appointment date, time, and location.  Offered the patient an appointment on today Friday 8/2/19, but the patient declined stating she was at work.

## 2019-08-05 ENCOUNTER — TELEPHONE (OUTPATIENT)
Dept: SURGERY | Facility: CLINIC | Age: 34
End: 2019-08-05

## 2019-08-05 NOTE — TELEPHONE ENCOUNTER
Contacted the patient regarding the message below. Patient voiced that she's in the process of moving and, that she needs to reschedule her appointment to Wednesday. I stated to the patient that Rosemarie is not in clinic this wed but I could reschedule her with another provider. Patient voiced understanding of this information. I will call her once I speak with Mihai Henning.        ----- Message from Jeffery James sent at 8/5/2019 10:42 AM CDT -----  Contact: Pt  Needs Advice    Reason for call:The Pt is moving her office today and can't make her appt.  Please contact the Pt to reschedule it.        Communication Preference:[phone]    Additional Information:

## 2019-08-05 NOTE — TELEPHONE ENCOUNTER
Contacted the patient regarding her appointment. I stated to the patient that Mihai Henning NP did not have any opening on Wednesday. I offered the patient to reschedule on another day. Patient voiced that she will call me back once she check her work schedule on tomorrow. I left my direct contact number for the patient to call me back.

## 2019-08-28 ENCOUNTER — TELEPHONE (OUTPATIENT)
Dept: SURGERY | Facility: CLINIC | Age: 34
End: 2019-08-28

## 2019-08-28 NOTE — TELEPHONE ENCOUNTER
Returned pt's call about getting a sooner appointment, left message to offer today at [facility] location

## 2019-09-05 ENCOUNTER — TELEPHONE (OUTPATIENT)
Dept: SURGERY | Facility: CLINIC | Age: 34
End: 2019-09-05

## 2019-09-06 ENCOUNTER — HOSPITAL ENCOUNTER (OUTPATIENT)
Dept: RADIOLOGY | Facility: HOSPITAL | Age: 34
Discharge: HOME | End: 2019-09-06
Attending: PHYSICIAN ASSISTANT
Payer: MEDICAID

## 2019-09-06 ENCOUNTER — OFFICE VISIT (OUTPATIENT)
Dept: SURGERY | Facility: CLINIC | Age: 34
End: 2019-09-06
Payer: MEDICAID

## 2019-09-06 VITALS
BODY MASS INDEX: 20.39 KG/M2 | HEIGHT: 58 IN | TEMPERATURE: 98 F | SYSTOLIC BLOOD PRESSURE: 104 MMHG | WEIGHT: 97.13 LBS | HEART RATE: 103 BPM | DIASTOLIC BLOOD PRESSURE: 72 MMHG

## 2019-09-06 DIAGNOSIS — N60.01 BILATERAL BREAST CYSTS: ICD-10-CM

## 2019-09-06 DIAGNOSIS — N60.02 BILATERAL BREAST CYSTS: ICD-10-CM

## 2019-09-06 DIAGNOSIS — N64.4 BREAST PAIN: Primary | ICD-10-CM

## 2019-09-06 DIAGNOSIS — N64.52 NIPPLE DISCHARGE: ICD-10-CM

## 2019-09-06 DIAGNOSIS — N64.4 BREAST PAIN: ICD-10-CM

## 2019-09-06 PROCEDURE — 99203 OFFICE O/P NEW LOW 30 MIN: CPT | Mod: S$PBB,,, | Performed by: PHYSICIAN ASSISTANT

## 2019-09-06 PROCEDURE — 99999 PR PBB SHADOW E&M-EST. PATIENT-LVL III: ICD-10-PCS | Mod: PBBFAC,,, | Performed by: PHYSICIAN ASSISTANT

## 2019-09-06 PROCEDURE — 77062 MAMMO DIGITAL DIAGNOSTIC BILAT WITH TOMOSYNTHESIS_CAD: ICD-10-PCS | Mod: 26,,, | Performed by: RADIOLOGY

## 2019-09-06 PROCEDURE — 99999 PR PBB SHADOW E&M-EST. PATIENT-LVL III: CPT | Mod: PBBFAC,,, | Performed by: PHYSICIAN ASSISTANT

## 2019-09-06 PROCEDURE — 77066 DX MAMMO INCL CAD BI: CPT | Mod: 26,,, | Performed by: RADIOLOGY

## 2019-09-06 PROCEDURE — 76642 ULTRASOUND BREAST LIMITED: CPT | Mod: 26,50,, | Performed by: RADIOLOGY

## 2019-09-06 PROCEDURE — 99203 PR OFFICE/OUTPT VISIT, NEW, LEVL III, 30-44 MIN: ICD-10-PCS | Mod: S$PBB,,, | Performed by: PHYSICIAN ASSISTANT

## 2019-09-06 PROCEDURE — 77066 DX MAMMO INCL CAD BI: CPT | Mod: TC,PO

## 2019-09-06 PROCEDURE — 76642 US BREAST BILATERAL LIMITED: ICD-10-PCS | Mod: 26,50,, | Performed by: RADIOLOGY

## 2019-09-06 PROCEDURE — 77062 BREAST TOMOSYNTHESIS BI: CPT | Mod: 26,,, | Performed by: RADIOLOGY

## 2019-09-06 PROCEDURE — 76642 ULTRASOUND BREAST LIMITED: CPT | Mod: TC,50,PO

## 2019-09-06 PROCEDURE — 99213 OFFICE O/P EST LOW 20 MIN: CPT | Mod: PBBFAC,25 | Performed by: PHYSICIAN ASSISTANT

## 2019-09-06 PROCEDURE — 77066 MAMMO DIGITAL DIAGNOSTIC BILAT WITH TOMOSYNTHESIS_CAD: ICD-10-PCS | Mod: 26,,, | Performed by: RADIOLOGY

## 2019-09-06 RX ORDER — PROMETHAZINE HYDROCHLORIDE AND DEXTROMETHORPHAN HYDROBROMIDE 6.25; 15 MG/5ML; MG/5ML
SYRUP ORAL
Refills: 0 | COMMUNITY
Start: 2019-08-20 | End: 2021-04-22

## 2019-09-06 RX ORDER — PREDNISONE 20 MG/1
TABLET ORAL
Refills: 0 | COMMUNITY
Start: 2019-08-20 | End: 2020-12-19

## 2019-09-06 RX ORDER — AZITHROMYCIN 250 MG/1
TABLET, FILM COATED ORAL
Refills: 0 | COMMUNITY
Start: 2019-08-20 | End: 2021-01-12

## 2019-09-06 RX ORDER — ALBUTEROL SULFATE 90 UG/1
AEROSOL, METERED RESPIRATORY (INHALATION)
Refills: 0 | COMMUNITY
Start: 2019-08-14 | End: 2021-01-12

## 2019-09-06 NOTE — PROGRESS NOTES
[facility]  [facility]  2019      SUBJECTIVE:   Ms. Ruby Lebron is a 34 y.o. female who presents today complaining of bilateral nipple discharge and breast pain.       History of Present Illness: Patient states she has had breast pain for the past 2 years and it is getting progressively worse.  She went to [facility] 2 years ago and they did a mammogram and ultrasound and told her it was probably cysts.  She describes the breast pain as occurring daily and worse on the left side.  She says it hurts to lay on her chest and even to hug someone.    Last month she noticed non-spontaneous white and clear nipple discharge secreted from both breasts.  She had been massaging her breasts because they were hurting when she noticed this.    She had a hysterectomy last year.  Her caffeine intake consists of 5-6 cokes per day.  She has no known family history of breast or ovarian cancer.    She denies palpating any breast masses.  She denies any skin changes or nipple inversion at either breast.    Past Medical History:   Diagnosis Date    Abnormal Pap smear 04/06/10    mild dysplasia    Abnormal Pap smear of cervix     Cervical cancer     Endometriosis     Hypertension      Past Surgical History:   Procedure Laterality Date    CERVICAL BIOPSY  W/ LOOP ELECTRODE EXCISION  2010    Mild dysplasia     SECTION, LOW TRANSVERSE       SECTION, LOW TRANSVERSE      HYSTERECTOMY      HYSTERECTOMY, TOTAL, LAPAROSCOPIC N/A 8/15/2018    [facility] OR    LYSIS, ADHESIONS, LAPAROSCOPIC  8/15/2018    [facility] at [facility] OR    PELVIC LAPAROSCOPY      SALPINGECTOMY, LAPAROSCOPIC Bilateral 8/15/2018    [facility] at [facility] OR     Social History     Socioeconomic History    Marital status:      Spouse name: Not on file    Number of children: Not on file    Years of education: Not on file    Highest education  level: Not on file   Occupational History    Not on file   Social Needs    Financial resource strain: Not on file    Food insecurity:     Worry: Not on file     Inability: Not on file    Transportation needs:     Medical: Not on file     Non-medical: Not on file   Tobacco Use    Smoking status: Current Every Day Smoker     Packs/day: 0.50     Years: 13.00     Pack years: 6.50    Smokeless tobacco: Never Used   Substance and Sexual Activity    Alcohol use: No    Drug use: No    Sexual activity: Not Currently     Partners: Male     Birth control/protection: OCP   Lifestyle    Physical activity:     Days per week: Not on file     Minutes per session: Not on file    Stress: Not on file   Relationships    Social connections:     Talks on phone: Not on file     Gets together: Not on file     Attends Yazdanism service: Not on file     Active member of club or organization: Not on file     Attends meetings of clubs or organizations: Not on file     Relationship status: Not on file   Other Topics Concern    Not on file   Social History Narrative    Together since 4/2015    He is a     She is a homemaker     Review of patient's allergies indicates:   Allergen Reactions    Percocet [oxycodone-acetaminophen] Hives and Itching      Family History: Maternal grandmother had colon cancer. Paternal grandfather had unknown type of cancer.      Review of Systems: Denies any chest pain or shortness of breath.  Denies any fever or chills.  See HPI/ Interval History for other systems reviewed.    OBJECTIVE:   Vitals:    09/06/19 0944   BP: 104/72   Pulse: 103   Temp: 98.1 °F (36.7 °C)      Physical Exam:  HEENT: Normocephalic, atraumatic.    General: alert and oriented; no acute distress.  Right breast: Clear and milky discharge secreted from 1 duct when pressure was applied to nipple.  Diffuse fibrocystic changes. Diffuse tenderness.  Left breast: Milky discharge secreted from >1 duct when pressure was  applied to nipple. ~.5 cm soft, mobile lump at 1 o'clock position.  ~1 cm soft, mobile lump at 6 o'clock position.  Diffuse tenderness.  Lymph: No palpable adjacent axillary lymph nodes.  No cervical or supraclavicular lymphadenopathy.      9/6/19 Bilateral Diagnostic Mammogram and Ultrasound:   The breasts are heterogeneously dense, which may obscure small masses.  There is no evidence of suspicious masses, microcalcifications or architectural distortion.  Limited breast ultrasound was performed at left breast lumps and bilateral subareolar regions.  Ultrasound evaluation demonstrates no suspicious abnormality.   Impression:  There is no mammographic or sonographic evidence of malignancy. Of note, the decision to biopsy any palpable finding should be based on clinical assessment.    BI-RADS Category 1: Negative     ASSESSMENT:  Ms. Ruby Lebron is a 34 y.o. year old female with bilateral breast pain and nipple discharge.      PLAN:   We discussed that the tender lumps palpated on breast exam were likely benign cysts.  There was nothing concerning seen on imaging to correlate with her breast pain or nipple discharge.  We discussed that the pain could be due to caffeine or hormonal fluctuations.  I advised her to decrease her intake of coke for relief, and if this doesn't help she can try over the counter vitamin E and flaxseed tablets.  The nipple discharge is likely an endocrine abnormality since there was nothing concerning seen on imaging and was mostly milky.  I ordered labs consisting of TSH, Free T4, and prolactin.  If these are all normal it may be attributed to a medication she is taking.    I will call her with the results and she can follow up with me as needed.      ~Rosemarie Torrez PA-C      Surgical Oncology            9/6/2019

## 2019-09-09 DIAGNOSIS — R79.89 ABNORMAL THYROID BLOOD TEST: Primary | ICD-10-CM

## 2019-09-09 DIAGNOSIS — N64.52 NIPPLE DISCHARGE: ICD-10-CM

## 2019-09-10 ENCOUNTER — TELEPHONE (OUTPATIENT)
Dept: SURGERY | Facility: CLINIC | Age: 34
End: 2019-09-10

## 2019-09-10 NOTE — TELEPHONE ENCOUNTER
I called this patient yesterday and we discussed that her prolactin level was normal but her free T4 was low, indicating a thyroid abnormality.  This is most likely the cause of her nipple discharge.  I will refer her to endocrinology for a further workup of this.    Rosemarie Torrez PA-C

## 2019-09-12 ENCOUNTER — OFFICE VISIT (OUTPATIENT)
Dept: ENDOCRINOLOGY | Facility: CLINIC | Age: 34
End: 2019-09-12
Payer: MEDICAID

## 2019-09-12 VITALS
HEIGHT: 58 IN | DIASTOLIC BLOOD PRESSURE: 78 MMHG | BODY MASS INDEX: 22.39 KG/M2 | HEART RATE: 72 BPM | WEIGHT: 106.69 LBS | SYSTOLIC BLOOD PRESSURE: 118 MMHG

## 2019-09-12 DIAGNOSIS — N64.52 NIPPLE DISCHARGE: ICD-10-CM

## 2019-09-12 DIAGNOSIS — R79.89 ABNORMAL THYROID BLOOD TEST: ICD-10-CM

## 2019-09-12 PROCEDURE — 99999 PR PBB SHADOW E&M-EST. PATIENT-LVL III: ICD-10-PCS | Mod: PBBFAC,,, | Performed by: INTERNAL MEDICINE

## 2019-09-12 PROCEDURE — 99204 PR OFFICE/OUTPT VISIT, NEW, LEVL IV, 45-59 MIN: ICD-10-PCS | Mod: S$PBB,,, | Performed by: INTERNAL MEDICINE

## 2019-09-12 PROCEDURE — 99204 OFFICE O/P NEW MOD 45 MIN: CPT | Mod: S$PBB,,, | Performed by: INTERNAL MEDICINE

## 2019-09-12 PROCEDURE — 99999 PR PBB SHADOW E&M-EST. PATIENT-LVL III: CPT | Mod: PBBFAC,,, | Performed by: INTERNAL MEDICINE

## 2019-09-12 PROCEDURE — 99213 OFFICE O/P EST LOW 20 MIN: CPT | Mod: PBBFAC | Performed by: INTERNAL MEDICINE

## 2019-09-12 NOTE — PROGRESS NOTES
Referring Provider:  Rosemarie Torrez PA    PCP:  Tamara Worrell MD (Inactive)    Reason for referral:   R79.89 (ICD-10-CM) - Abnormal thyroid blood test   N64.52 (ICD-10-CM) - Nipple discharge     CC:  To check on thyroid    HPI:  Ruby Lebron 34 y.o. female  Patient said she visited emergency room because of her breast pain about 6 weeks ago and later she was seen by a breast surgeon.  She has a history of a cyst in right breast for 1 year and history of flare up of breast problem about once a month.  She was found to have breast discharge recently, 1st time was about 6 weeks ago.  The discharge is mostly when patient  Squeezes her breast nipples.  She believes that discharge was clear in right breast nipple and it was milk in left breast nipple.  She has no history of thyroid gland problem, pituitary gland problem, or adrenal gland problem.  She had endometriosis  Then she had full hysterectomy performed 1 year ago.  She is not on hormone replacement therapy.  She was treated with steroid and antibiotic for upper respiratory infection couple weeks ago with last dose of steroid was 2 weeks ago per patient.  No complaints of dysphagia, chest pain, shortness of breath, nausea, vomiting, or edema.    Labs showed free T4 0.6 and normal TSH and normal prolactin      Father's sister had Graves  Mom has thyroid problem  A daughter has borderline hypothyroidism    Past Medical History:   Diagnosis Date    Abnormal Pap smear 04/06/10    mild dysplasia    Abnormal Pap smear of cervix     Cervical cancer     Endometriosis     Hypertension        Past Surgical History:   Procedure Laterality Date    CERVICAL BIOPSY  W/ LOOP ELECTRODE EXCISION  2010    Mild dysplasia     SECTION, LOW TRANSVERSE       SECTION, LOW TRANSVERSE      HYSTERECTOMY      HYSTERECTOMY, TOTAL, LAPAROSCOPIC N/A 8/15/2018    [facility]    LYSIS, ADHESIONS, LAPAROSCOPIC   8/15/2018    [facility]    PELVIC LAPAROSCOPY      SALPINGECTOMY, LAPAROSCOPIC Bilateral 8/15/2018    [facility] OR       Social History     Socioeconomic History    Marital status:      Spouse name: Not on file    Number of children: Not on file    Years of education: Not on file    Highest education level: Not on file   Occupational History    Not on file   Social Needs    Financial resource strain: Not on file    Food insecurity:     Worry: Not on file     Inability: Not on file    Transportation needs:     Medical: Not on file     Non-medical: Not on file   Tobacco Use    Smoking status: Current Every Day Smoker     Packs/day: 0.50     Years: 13.00     Pack years: 6.50    Smokeless tobacco: Never Used   Substance and Sexual Activity    Alcohol use: No    Drug use: No    Sexual activity: Not Currently     Partners: Male     Birth control/protection: OCP   Lifestyle    Physical activity:     Days per week: Not on file     Minutes per session: Not on file    Stress: Not on file   Relationships    Social connections:     Talks on phone: Not on file     Gets together: Not on file     Attends Confucianism service: Not on file     Active member of club or organization: Not on file     Attends meetings of clubs or organizations: Not on file     Relationship status: Not on file   Other Topics Concern    Not on file   Social History Narrative    Together since 4/2015    He is a     She is a homemaker       ROS:   Headache x several years 9 sec to Migraine)   Migraine headache x years, about twice a week  Insomnia  Can see well with glasses  No sig weight changes except for some weight loss  Appetite is decreased  On propranolol for bp and heart rate  Tooth cavity; problem in teeth  Dry skin  ROS otherwise neg except for what is mentioned in the PMH, PSH and HPI    PE:  Vitals:    09/12/19 1442   BP: 118/78   Pulse: 72     Alert  and oriented  No acute distress  No acne  No Proptosis or conjunctivitis  No rash on tongue, + teeth; ?a cavity/brown spot L upper mole  No goitre by inspection  Thyroid gland is not palpable  No cervical lymphadenopathy  Heart reg, no gallop  Lungs cta, no wheezing  Abd soft, no tnd  No edema in lower legs  No rash  No bruises  Speech normal  Behavior normal  No tremor  No obesity  Body mass index is 22.3 kg/m².      Lab:    Lab Results   Component Value Date    TSH 1.073 09/06/2019    FREET4 0.63 (L) 09/06/2019       Lab Results   Component Value Date     08/10/2018    K 4.2 08/10/2018     08/10/2018    CO2 24 08/10/2018    BUN 10 08/10/2018    CREATININE 0.7 08/10/2018    CALCIUM 9.0 08/10/2018    ANIONGAP 5 (L) 08/10/2018    ESTGFRAFRICA >60 08/10/2018    EGFRNONAA >60 08/10/2018     Lab Results   Component Value Date    CREATRANDUR 21 09/10/2009       A/P:  Abnormal thyroid blood test  Low TSH is likely secondary to sick euthyroid syndrome  Bilateral Nipple discharge   Rule out galactorrhea  Status post hysterectomy  History of frequent migraine headache  The following blood test to be done;  -     Prolactin; Future; Expected date: 09/12/2019  -     Cortisol, 8AM; Future; Expected date: 09/12/2019  -     T4, free; Future; Expected date: 09/12/2019  -     TSH; Future; Expected date: 09/12/2019  -     T3; Future; Expected date: 09/12/2019    For breast lesions/pain patient to continue the follow-up with her breast surgeon.    Appt in 4 weeks    Pt understands the plan and instructions.

## 2019-09-13 ENCOUNTER — LAB VISIT (OUTPATIENT)
Dept: LAB | Facility: HOSPITAL | Age: 34
End: 2019-09-13
Attending: INTERNAL MEDICINE
Payer: MEDICAID

## 2019-09-13 DIAGNOSIS — N64.52 NIPPLE DISCHARGE: ICD-10-CM

## 2019-09-13 DIAGNOSIS — R79.89 ABNORMAL THYROID BLOOD TEST: ICD-10-CM

## 2019-09-13 LAB
CORTIS SERPL-MCNC: 19.2 UG/DL (ref 4.3–22.4)
PROLACTIN SERPL IA-MCNC: 8.1 NG/ML (ref 5.2–26.5)
T3 SERPL-MCNC: 99 NG/DL (ref 60–180)
T4 FREE SERPL-MCNC: 0.71 NG/DL (ref 0.71–1.51)
TSH SERPL DL<=0.005 MIU/L-ACNC: 0.99 UIU/ML (ref 0.4–4)

## 2019-09-13 PROCEDURE — 36415 COLL VENOUS BLD VENIPUNCTURE: CPT

## 2019-09-13 PROCEDURE — 82533 TOTAL CORTISOL: CPT

## 2019-09-13 PROCEDURE — 84439 ASSAY OF FREE THYROXINE: CPT

## 2019-09-13 PROCEDURE — 84443 ASSAY THYROID STIM HORMONE: CPT

## 2019-09-13 PROCEDURE — 84146 ASSAY OF PROLACTIN: CPT

## 2019-09-13 PROCEDURE — 84480 ASSAY TRIIODOTHYRONINE (T3): CPT

## 2020-12-19 ENCOUNTER — HOSPITAL ENCOUNTER (EMERGENCY)
Facility: HOSPITAL | Age: 35
Discharge: HOME | End: 2020-12-19
Attending: EMERGENCY MEDICINE
Payer: MEDICAID

## 2020-12-19 VITALS
TEMPERATURE: 98 F | OXYGEN SATURATION: 100 % | HEART RATE: 88 BPM | BODY MASS INDEX: 23.09 KG/M2 | DIASTOLIC BLOOD PRESSURE: 83 MMHG | HEIGHT: 58 IN | WEIGHT: 110 LBS | SYSTOLIC BLOOD PRESSURE: 137 MMHG | RESPIRATION RATE: 16 BRPM

## 2020-12-19 DIAGNOSIS — R52 PAINFUL COUGH: ICD-10-CM

## 2020-12-19 DIAGNOSIS — J06.9 UPPER RESPIRATORY TRACT INFECTION, UNSPECIFIED TYPE: Primary | ICD-10-CM

## 2020-12-19 DIAGNOSIS — R05.8 PAINFUL COUGH: ICD-10-CM

## 2020-12-19 LAB
CTP QC/QA: YES
SARS-COV-2 RDRP RESP QL NAA+PROBE: NEGATIVE

## 2020-12-19 PROCEDURE — 93010 ELECTROCARDIOGRAM REPORT: CPT | Mod: ,,, | Performed by: INTERNAL MEDICINE

## 2020-12-19 PROCEDURE — 99284 EMERGENCY DEPT VISIT MOD MDM: CPT | Mod: 25,ER

## 2020-12-19 PROCEDURE — 93010 EKG 12-LEAD: ICD-10-PCS | Mod: ,,, | Performed by: INTERNAL MEDICINE

## 2020-12-19 PROCEDURE — U0002 COVID-19 LAB TEST NON-CDC: HCPCS | Mod: ER | Performed by: NURSE PRACTITIONER

## 2020-12-19 PROCEDURE — 93005 ELECTROCARDIOGRAM TRACING: CPT | Mod: ER

## 2020-12-19 RX ORDER — BENZONATATE 200 MG/1
200 CAPSULE ORAL 3 TIMES DAILY PRN
Qty: 30 CAPSULE | Refills: 0 | Status: SHIPPED | OUTPATIENT
Start: 2020-12-19 | End: 2020-12-29

## 2020-12-19 RX ORDER — PREDNISONE 20 MG/1
40 TABLET ORAL DAILY
Qty: 10 TABLET | Refills: 0 | Status: SHIPPED | OUTPATIENT
Start: 2020-12-19 | End: 2020-12-24

## 2020-12-19 RX ORDER — CETIRIZINE HYDROCHLORIDE 10 MG/1
10 TABLET ORAL DAILY PRN
Qty: 30 TABLET | Refills: 0 | Status: SHIPPED | OUTPATIENT
Start: 2020-12-19 | End: 2021-04-22

## 2020-12-20 NOTE — ED PROVIDER NOTES
Encounter Date: 2020    SCRIBE #1 NOTE: I, Carmen Parrish, am scribing for, and in the presence of,  Alejandrina Yung NP. I have scribed the following portions of the note - Other sections scribed: HPI, ROS, PE.       History     Chief Complaint   Patient presents with    COVID-19 Concerns     fever, cough, congestion, and headache since yesterday     Ruby Lebron is a 35 y.o. female who presents to the ED complaining of nasal congestion and a cough for x2 days. Denies fever. Denies COVID-19 exposure.     The history is provided by the patient.   URI  The primary symptoms include headaches and cough. Primary symptoms do not include fever, sore throat, nausea or rash. The current episode started two days ago. The problem has been gradually worsening.   The headache began 2 days ago (sinus pressure ).   The cough began yesterday. The cough is non-productive.   Symptoms associated with the illness include congestion.     Review of patient's allergies indicates:   Allergen Reactions    Percocet [oxycodone-acetaminophen] Hives and Itching     Past Medical History:   Diagnosis Date    Abnormal Pap smear 04/06/10    mild dysplasia    Abnormal Pap smear of cervix     Cervical cancer     Endometriosis     Hypertension      Past Surgical History:   Procedure Laterality Date    CERVICAL BIOPSY  W/ LOOP ELECTRODE EXCISION  2010    Mild dysplasia     SECTION, LOW TRANSVERSE       SECTION, LOW TRANSVERSE      HYSTERECTOMY      LAPAROSCOPIC LYSIS OF ADHESIONS  8/15/2018    Procedure: LYSIS, ADHESIONS, LAPAROSCOPIC;  Surgeon: Donny Damon MD;  Location: [facility] OR;  Service: OB/GYN;;    LAPAROSCOPIC SALPINGECTOMY Bilateral 8/15/2018    Procedure: SALPINGECTOMY, LAPAROSCOPIC;  Surgeon: Donny Damon MD;  Location: [facility] OR;  Service: OB/GYN;  Laterality: Bilateral;    LAPAROSCOPIC TOTAL HYSTERECTOMY N/A 8/15/2018    Procedure: HYSTERECTOMY, TOTAL, LAPAROSCOPIC;  Surgeon: Donny Damon MD;   Location: [facility] OR;  Service: OB/GYN;  Laterality: N/A;  TF DR NGUYEN PER DR TORRES  RN PRE OP 8-10-18    PELVIC LAPAROSCOPY       Family History   Problem Relation Age of Onset    Hypertension Father     Hypertension Mother      Social History     Tobacco Use    Smoking status: Current Every Day Smoker     Packs/day: 0.50     Years: 13.00     Pack years: 6.50    Smokeless tobacco: Never Used   Substance Use Topics    Alcohol use: No    Drug use: No     Review of Systems   Constitutional: Negative.  Negative for fever.   HENT: Positive for congestion. Negative for sore throat.    Eyes: Negative.    Respiratory: Positive for cough. Negative for shortness of breath.    Cardiovascular: Negative.  Negative for chest pain.   Gastrointestinal: Negative.  Negative for nausea.   Endocrine: Negative.    Genitourinary: Negative.  Negative for dysuria.   Musculoskeletal: Negative.  Negative for back pain.   Skin: Negative.  Negative for rash.   Allergic/Immunologic: Negative.    Neurological: Positive for headaches. Negative for weakness.   Hematological: Negative.  Does not bruise/bleed easily.   All other systems reviewed and are negative.      Physical Exam     Initial Vitals   BP Pulse Resp Temp SpO2   12/19/20 1713 12/19/20 1712 12/19/20 1712 12/19/20 1712 12/19/20 1712   137/83 88 16 98.4 °F (36.9 °C) 100 %      MAP       --                Physical Exam    Nursing note and vitals reviewed.  Constitutional: She appears well-developed.   HENT:   Right Ear: Tympanic membrane and external ear normal.   Left Ear: Tympanic membrane and external ear normal.   Nose: Mucosal edema present.   Mouth/Throat: Oropharynx is clear and moist.   Eyes: Conjunctivae are normal.   Neck: Normal range of motion. Neck supple.   Cardiovascular: Normal rate, regular rhythm, S1 normal, S2 normal and normal heart sounds. Exam reveals no gallop and no friction rub.    No murmur heard.  Pulmonary/Chest: Effort normal and breath sounds normal.  No respiratory distress. She has no wheezes. She has no rhonchi. She has no rales.   Abdominal: Soft. She exhibits no distension.   Musculoskeletal: Normal range of motion. No tenderness or edema.   Neurological: She is alert and oriented to person, place, and time.   Skin: Skin is warm and dry. No rash noted.   Psychiatric: She has a normal mood and affect. Her behavior is normal.         ED Course   Procedures  Labs Reviewed   SARS-COV-2 RDRP GENE          Imaging Results    None          Medical Decision Making:   Initial Assessment:   Ruby Lebron is a 35 y.o. female who presents to the ED complaining of nasal congestion and a cough for x2 days. Denies fever. Denies COVID-19 exposure.     Differential Diagnosis:   URI, COVID, Influenza, Acute sinusitis   Clinical Tests:   Lab Tests: Ordered and Reviewed  Medical Tests: Ordered and Reviewed  ED Management:  Physical exam performed.   COVID-19 negative.  Discharged with prednisone, tessalon perles, and claritin.   Follow-up with PCP in 2 days.             Scribe Attestation:   Scribe #1: I performed the above scribed service and the documentation accurately describes the services I performed. I attest to the accuracy of the note.    This document was produced by a scribe under my direction and in my presence. I agree with the content of the note and have made any necessary edits.     LEYDI Perrin    12/19/2020 8:58 PM                  Clinical Impression:     1) Upper respiratory infection          ED Disposition Condition    Discharge Stable        ED Prescriptions     Medication Sig Dispense Start Date End Date Auth. Provider    predniSONE (DELTASONE) 20 MG tablet Take 2 tablets (40 mg total) by mouth once daily. for 5 days 10 tablet 12/19/2020 12/24/2020 LEYDI David    benzonatate (TESSALON) 200 MG capsule Take 1 capsule (200 mg total) by mouth 3 (three) times daily as needed for Cough. 30 capsule 12/19/2020 12/29/2020 LEYDI David     cetirizine (ZYRTEC) 10 MG tablet Take 1 tablet (10 mg total) by mouth daily as needed for Allergies. 30 tablet 12/19/2020 1/18/2021 LEYDI David        Follow-up Information    None                                      LEYDI David  12/19/20 2059

## 2021-01-07 ENCOUNTER — TELEPHONE (OUTPATIENT)
Dept: ADMINISTRATIVE | Facility: OTHER | Age: 36
End: 2021-01-07

## 2021-01-12 ENCOUNTER — OFFICE VISIT (OUTPATIENT)
Dept: NEUROLOGY | Facility: CLINIC | Age: 36
End: 2021-01-12
Payer: MEDICAID

## 2021-01-12 ENCOUNTER — TELEPHONE (OUTPATIENT)
Dept: ADMINISTRATIVE | Facility: OTHER | Age: 36
End: 2021-01-12

## 2021-01-12 ENCOUNTER — LAB VISIT (OUTPATIENT)
Dept: LAB | Facility: HOSPITAL | Age: 36
End: 2021-01-12
Attending: PSYCHIATRY & NEUROLOGY
Payer: MEDICAID

## 2021-01-12 VITALS
DIASTOLIC BLOOD PRESSURE: 78 MMHG | BODY MASS INDEX: 22.88 KG/M2 | SYSTOLIC BLOOD PRESSURE: 112 MMHG | HEART RATE: 81 BPM | WEIGHT: 109 LBS | HEIGHT: 58 IN

## 2021-01-12 DIAGNOSIS — R20.0 NUMBNESS AND TINGLING: ICD-10-CM

## 2021-01-12 DIAGNOSIS — E61.1 IRON DEFICIENCY: ICD-10-CM

## 2021-01-12 DIAGNOSIS — R20.2 NUMBNESS AND TINGLING: ICD-10-CM

## 2021-01-12 DIAGNOSIS — R56.9 CONVULSIONS, UNSPECIFIED CONVULSION TYPE: ICD-10-CM

## 2021-01-12 DIAGNOSIS — R56.9 SINGLE SEIZURE: ICD-10-CM

## 2021-01-12 DIAGNOSIS — R41.89 BRAIN FOG: ICD-10-CM

## 2021-01-12 DIAGNOSIS — G25.81 RESTLESS LEGS: ICD-10-CM

## 2021-01-12 DIAGNOSIS — F32.A ANXIETY AND DEPRESSION: ICD-10-CM

## 2021-01-12 DIAGNOSIS — F41.9 ANXIETY AND DEPRESSION: ICD-10-CM

## 2021-01-12 DIAGNOSIS — I73.00 RAYNAUD'S DISEASE WITHOUT GANGRENE: ICD-10-CM

## 2021-01-12 LAB
ERYTHROCYTE [SEDIMENTATION RATE] IN BLOOD BY WESTERGREN METHOD: 6 MM/HR (ref 0–20)
ESTIMATED AVG GLUCOSE: 100 MG/DL (ref 68–131)
FERRITIN SERPL-MCNC: 13 NG/ML (ref 20–300)
FOLATE SERPL-MCNC: 13.5 NG/ML (ref 4–24)
HBA1C MFR BLD HPLC: 5.1 % (ref 4–5.6)
IRON SERPL-MCNC: 72 UG/DL (ref 30–160)
RHEUMATOID FACT SERPL-ACNC: <10 IU/ML (ref 0–15)
SATURATED IRON: 19 % (ref 20–50)
TOTAL IRON BINDING CAPACITY: 382 UG/DL (ref 250–450)
TRANSFERRIN SERPL-MCNC: 258 MG/DL (ref 200–375)
TRANSFERRIN SERPL-MCNC: 258 MG/DL (ref 200–375)

## 2021-01-12 PROCEDURE — 99205 OFFICE O/P NEW HI 60 MIN: CPT | Mod: S$PBB,,, | Performed by: PSYCHIATRY & NEUROLOGY

## 2021-01-12 PROCEDURE — 86431 RHEUMATOID FACTOR QUANT: CPT

## 2021-01-12 PROCEDURE — 99999 PR PBB SHADOW E&M-EST. PATIENT-LVL IV: ICD-10-PCS | Mod: PBBFAC,,, | Performed by: PSYCHIATRY & NEUROLOGY

## 2021-01-12 PROCEDURE — 99205 PR OFFICE/OUTPT VISIT, NEW, LEVL V, 60-74 MIN: ICD-10-PCS | Mod: S$PBB,,, | Performed by: PSYCHIATRY & NEUROLOGY

## 2021-01-12 PROCEDURE — 84425 ASSAY OF VITAMIN B-1: CPT

## 2021-01-12 PROCEDURE — 83036 HEMOGLOBIN GLYCOSYLATED A1C: CPT

## 2021-01-12 PROCEDURE — 82746 ASSAY OF FOLIC ACID SERUM: CPT

## 2021-01-12 PROCEDURE — 99214 OFFICE O/P EST MOD 30 MIN: CPT | Mod: PBBFAC,PN | Performed by: PSYCHIATRY & NEUROLOGY

## 2021-01-12 PROCEDURE — 83921 ORGANIC ACID SINGLE QUANT: CPT

## 2021-01-12 PROCEDURE — 36415 COLL VENOUS BLD VENIPUNCTURE: CPT

## 2021-01-12 PROCEDURE — 86038 ANTINUCLEAR ANTIBODIES: CPT

## 2021-01-12 PROCEDURE — 83540 ASSAY OF IRON: CPT

## 2021-01-12 PROCEDURE — 99999 PR PBB SHADOW E&M-EST. PATIENT-LVL IV: CPT | Mod: PBBFAC,,, | Performed by: PSYCHIATRY & NEUROLOGY

## 2021-01-12 PROCEDURE — 85652 RBC SED RATE AUTOMATED: CPT

## 2021-01-12 PROCEDURE — 82728 ASSAY OF FERRITIN: CPT

## 2021-01-12 PROCEDURE — 82607 VITAMIN B-12: CPT

## 2021-01-12 RX ORDER — ASPIRIN 325 MG
50000 TABLET, DELAYED RELEASE (ENTERIC COATED) ORAL
COMMUNITY
Start: 2020-12-10

## 2021-01-12 RX ORDER — ERGOCALCIFEROL 1.25 MG/1
CAPSULE ORAL
COMMUNITY
Start: 2021-01-07

## 2021-01-12 RX ORDER — METOPROLOL SUCCINATE 25 MG/1
25 TABLET, EXTENDED RELEASE ORAL DAILY
COMMUNITY
Start: 2020-12-19

## 2021-01-12 RX ORDER — ESCITALOPRAM OXALATE 20 MG/1
20 TABLET ORAL DAILY
COMMUNITY
Start: 2020-12-19

## 2021-01-12 RX ORDER — ALBUTEROL SULFATE 90 UG/1
AEROSOL, METERED RESPIRATORY (INHALATION)
COMMUNITY
Start: 2020-04-08

## 2021-01-12 RX ORDER — GABAPENTIN 300 MG/1
300 CAPSULE ORAL EVERY 8 HOURS
Qty: 90 CAPSULE | Refills: 2 | Status: SHIPPED | OUTPATIENT
Start: 2021-01-12 | End: 2021-04-22

## 2021-01-12 RX ORDER — GABAPENTIN 300 MG/1
300 CAPSULE ORAL EVERY 8 HOURS
Qty: 90 CAPSULE | Refills: 2 | Status: SHIPPED | OUTPATIENT
Start: 2021-01-12 | End: 2021-01-12 | Stop reason: SDUPTHER

## 2021-01-12 RX ORDER — BUSPIRONE HYDROCHLORIDE 15 MG/1
15 TABLET ORAL 2 TIMES DAILY
COMMUNITY
Start: 2020-12-24

## 2021-01-12 RX ORDER — BUPRENORPHINE HYDROCHLORIDE, NALOXONE HYDROCHLORIDE 8; 2 MG/1; MG/1
FILM, SOLUBLE BUCCAL; SUBLINGUAL
COMMUNITY
Start: 2020-11-23

## 2021-01-13 LAB
ANA SER QL IF: NORMAL
VIT B12 SERPL-MCNC: 163 NG/L (ref 180–914)

## 2021-01-14 ENCOUNTER — PATIENT MESSAGE (OUTPATIENT)
Dept: NEUROLOGY | Facility: CLINIC | Age: 36
End: 2021-01-14

## 2021-01-14 DIAGNOSIS — E53.8 B12 DEFICIENCY: Primary | ICD-10-CM

## 2021-01-14 RX ORDER — CYANOCOBALAMIN 1000 UG/ML
INJECTION, SOLUTION INTRAMUSCULAR; SUBCUTANEOUS
Qty: 10 ML | Refills: 0 | Status: SHIPPED | OUTPATIENT
Start: 2021-01-14

## 2021-01-15 ENCOUNTER — HOSPITAL ENCOUNTER (EMERGENCY)
Facility: HOSPITAL | Age: 36
Discharge: HOME | End: 2021-01-15
Attending: INTERNAL MEDICINE
Payer: MEDICAID

## 2021-01-15 VITALS
HEIGHT: 58 IN | RESPIRATION RATE: 20 BRPM | TEMPERATURE: 98 F | SYSTOLIC BLOOD PRESSURE: 133 MMHG | DIASTOLIC BLOOD PRESSURE: 83 MMHG | OXYGEN SATURATION: 99 % | HEART RATE: 108 BPM | BODY MASS INDEX: 22.04 KG/M2 | WEIGHT: 105 LBS

## 2021-01-15 DIAGNOSIS — M54.12 CERVICAL RADICULOPATHY: Primary | ICD-10-CM

## 2021-01-15 LAB — METHYLMALONATE SERPL-SCNC: 0.18 NMOL/ML

## 2021-01-15 PROCEDURE — 63600175 PHARM REV CODE 636 W HCPCS: Mod: ER | Performed by: INTERNAL MEDICINE

## 2021-01-15 PROCEDURE — 96372 THER/PROPH/DIAG INJ SC/IM: CPT | Mod: ER

## 2021-01-15 PROCEDURE — 99284 EMERGENCY DEPT VISIT MOD MDM: CPT | Mod: 25,ER

## 2021-01-15 PROCEDURE — 25000003 PHARM REV CODE 250: Mod: ER | Performed by: INTERNAL MEDICINE

## 2021-01-15 RX ORDER — METHOCARBAMOL 750 MG/1
1500 TABLET, FILM COATED ORAL
Status: COMPLETED | OUTPATIENT
Start: 2021-01-15 | End: 2021-01-15

## 2021-01-15 RX ORDER — KETOROLAC TROMETHAMINE 30 MG/ML
60 INJECTION, SOLUTION INTRAMUSCULAR; INTRAVENOUS
Status: COMPLETED | OUTPATIENT
Start: 2021-01-15 | End: 2021-01-15

## 2021-01-15 RX ORDER — METHOCARBAMOL 750 MG/1
1500 TABLET, FILM COATED ORAL 3 TIMES DAILY
Qty: 30 TABLET | Refills: 0 | Status: SHIPPED | OUTPATIENT
Start: 2021-01-15 | End: 2021-01-20

## 2021-01-15 RX ORDER — PREDNISONE 20 MG/1
60 TABLET ORAL
Status: COMPLETED | OUTPATIENT
Start: 2021-01-15 | End: 2021-01-15

## 2021-01-15 RX ORDER — IBUPROFEN 600 MG/1
600 TABLET ORAL 3 TIMES DAILY
Qty: 30 TABLET | Refills: 0 | Status: SHIPPED | OUTPATIENT
Start: 2021-01-15

## 2021-01-15 RX ADMIN — KETOROLAC TROMETHAMINE 60 MG: 30 INJECTION, SOLUTION INTRAMUSCULAR at 10:01

## 2021-01-15 RX ADMIN — PREDNISONE 60 MG: 20 TABLET ORAL at 10:01

## 2021-01-15 RX ADMIN — METHOCARBAMOL 1500 MG: 750 TABLET ORAL at 10:01

## 2021-01-18 LAB — VIT B1 BLD-MCNC: 52 UG/L (ref 38–122)

## 2021-01-19 ENCOUNTER — HOSPITAL ENCOUNTER (OUTPATIENT)
Dept: RADIOLOGY | Facility: HOSPITAL | Age: 36
Discharge: HOME | End: 2021-01-19
Attending: PSYCHIATRY & NEUROLOGY
Payer: MEDICAID

## 2021-01-19 ENCOUNTER — PATIENT MESSAGE (OUTPATIENT)
Dept: NEUROLOGY | Facility: CLINIC | Age: 36
End: 2021-01-19

## 2021-01-19 DIAGNOSIS — R56.9 CONVULSIONS, UNSPECIFIED CONVULSION TYPE: ICD-10-CM

## 2021-01-19 PROCEDURE — 25500020 PHARM REV CODE 255: Performed by: PSYCHIATRY & NEUROLOGY

## 2021-01-19 PROCEDURE — 70553 MRI BRAIN STEM W/O & W/DYE: CPT | Mod: 26,,, | Performed by: RADIOLOGY

## 2021-01-19 PROCEDURE — 70553 MRI BRAIN STEM W/O & W/DYE: CPT | Mod: TC

## 2021-01-19 PROCEDURE — A9585 GADOBUTROL INJECTION: HCPCS | Performed by: PSYCHIATRY & NEUROLOGY

## 2021-01-19 PROCEDURE — 70553 MRI BRAIN W WO CONTRAST: ICD-10-PCS | Mod: 26,,, | Performed by: RADIOLOGY

## 2021-01-19 RX ORDER — GADOBUTROL 604.72 MG/ML
6 INJECTION INTRAVENOUS
Status: COMPLETED | OUTPATIENT
Start: 2021-01-19 | End: 2021-01-19

## 2021-01-19 RX ADMIN — GADOBUTROL 6 ML: 604.72 INJECTION INTRAVENOUS at 03:01

## 2021-01-20 DIAGNOSIS — M54.2 NONTRAUMATIC NECK PAIN: ICD-10-CM

## 2021-01-20 DIAGNOSIS — M54.2 CERVICALGIA: ICD-10-CM

## 2021-01-20 DIAGNOSIS — R20.0 NUMBNESS AND TINGLING: Primary | ICD-10-CM

## 2021-01-20 DIAGNOSIS — R20.2 NUMBNESS AND TINGLING: Primary | ICD-10-CM

## 2021-01-21 ENCOUNTER — TELEPHONE (OUTPATIENT)
Dept: ADMINISTRATIVE | Facility: OTHER | Age: 36
End: 2021-01-21

## 2021-01-21 ENCOUNTER — TELEPHONE (OUTPATIENT)
Dept: NEUROLOGY | Facility: CLINIC | Age: 36
End: 2021-01-21

## 2021-02-04 ENCOUNTER — HOSPITAL ENCOUNTER (OUTPATIENT)
Dept: RADIOLOGY | Facility: HOSPITAL | Age: 36
Discharge: HOME | End: 2021-02-04
Attending: PSYCHIATRY & NEUROLOGY
Payer: MEDICAID

## 2021-02-04 DIAGNOSIS — M54.2 CERVICALGIA: ICD-10-CM

## 2021-02-04 PROCEDURE — 72141 MRI CERVICAL SPINE WITHOUT CONTRAST: ICD-10-PCS | Mod: 26,,, | Performed by: RADIOLOGY

## 2021-02-04 PROCEDURE — 72141 MRI NECK SPINE W/O DYE: CPT | Mod: TC

## 2021-02-04 PROCEDURE — 72141 MRI NECK SPINE W/O DYE: CPT | Mod: 26,,, | Performed by: RADIOLOGY

## 2021-02-18 ENCOUNTER — PATIENT MESSAGE (OUTPATIENT)
Dept: NEUROLOGY | Facility: CLINIC | Age: 36
End: 2021-02-18

## 2021-02-26 ENCOUNTER — HOSPITAL ENCOUNTER (OUTPATIENT)
Dept: NEUROLOGY | Facility: CLINIC | Age: 36
Discharge: HOME | End: 2021-02-26
Payer: MEDICAID

## 2021-02-26 DIAGNOSIS — R56.9 CONVULSIONS, UNSPECIFIED CONVULSION TYPE: ICD-10-CM

## 2021-02-26 PROCEDURE — 95816 PR EEG,W/AWAKE & DROWSY RECORD: ICD-10-PCS | Mod: 26,S$PBB,, | Performed by: PSYCHIATRY & NEUROLOGY

## 2021-02-26 PROCEDURE — 95816 EEG AWAKE AND DROWSY: CPT | Mod: 26,S$PBB,, | Performed by: PSYCHIATRY & NEUROLOGY

## 2021-02-26 PROCEDURE — 95816 EEG AWAKE AND DROWSY: CPT | Mod: PBBFAC | Performed by: PSYCHIATRY & NEUROLOGY

## 2021-03-09 ENCOUNTER — PATIENT MESSAGE (OUTPATIENT)
Dept: NEUROLOGY | Facility: CLINIC | Age: 36
End: 2021-03-09

## 2021-03-24 ENCOUNTER — OFFICE VISIT (OUTPATIENT)
Dept: NEUROLOGY | Facility: CLINIC | Age: 36
End: 2021-03-24
Payer: MEDICAID

## 2021-03-24 ENCOUNTER — PROCEDURE VISIT (OUTPATIENT)
Dept: NEUROLOGY | Facility: CLINIC | Age: 36
End: 2021-03-24
Payer: MEDICAID

## 2021-03-24 VITALS
DIASTOLIC BLOOD PRESSURE: 79 MMHG | SYSTOLIC BLOOD PRESSURE: 115 MMHG | HEART RATE: 81 BPM | SYSTOLIC BLOOD PRESSURE: 115 MMHG | WEIGHT: 118.19 LBS | HEART RATE: 81 BPM | WEIGHT: 118.19 LBS | HEIGHT: 58 IN | HEIGHT: 58 IN | BODY MASS INDEX: 24.81 KG/M2 | BODY MASS INDEX: 24.81 KG/M2 | DIASTOLIC BLOOD PRESSURE: 79 MMHG

## 2021-03-24 DIAGNOSIS — R20.0 NUMBNESS AND TINGLING: ICD-10-CM

## 2021-03-24 DIAGNOSIS — R20.0 NUMBNESS OF LOWER LIMB: ICD-10-CM

## 2021-03-24 DIAGNOSIS — M79.601 PAIN IN BOTH UPPER EXTREMITIES: Primary | ICD-10-CM

## 2021-03-24 DIAGNOSIS — R20.2 NUMBNESS AND TINGLING: ICD-10-CM

## 2021-03-24 DIAGNOSIS — M79.602 PAIN IN BOTH UPPER EXTREMITIES: Primary | ICD-10-CM

## 2021-03-24 DIAGNOSIS — M79.605 PAIN IN BOTH LOWER EXTREMITIES: ICD-10-CM

## 2021-03-24 DIAGNOSIS — R20.0 NUMBNESS OF UPPER LIMB: ICD-10-CM

## 2021-03-24 DIAGNOSIS — M79.604 PAIN IN BOTH LOWER EXTREMITIES: ICD-10-CM

## 2021-03-24 DIAGNOSIS — M79.601 PAIN IN BOTH UPPER EXTREMITIES: ICD-10-CM

## 2021-03-24 DIAGNOSIS — M79.602 PAIN IN BOTH UPPER EXTREMITIES: ICD-10-CM

## 2021-03-24 PROCEDURE — 99213 PR OFFICE/OUTPT VISIT, EST, LEVL III, 20-29 MIN: ICD-10-PCS | Mod: 25,S$PBB,, | Performed by: PSYCHIATRY & NEUROLOGY

## 2021-03-24 PROCEDURE — 95886 PR EMG COMPLETE, W/ NERVE CONDUCTION STUDIES, 5+ MUSCLES: ICD-10-PCS | Mod: 26,S$PBB,, | Performed by: PSYCHIATRY & NEUROLOGY

## 2021-03-24 PROCEDURE — 99999 PR PBB SHADOW E&M-EST. PATIENT-LVL III: CPT | Mod: PBBFAC,,, | Performed by: PSYCHIATRY & NEUROLOGY

## 2021-03-24 PROCEDURE — 99213 OFFICE O/P EST LOW 20 MIN: CPT | Mod: PBBFAC,PN,25 | Performed by: PSYCHIATRY & NEUROLOGY

## 2021-03-24 PROCEDURE — 95913 NRV CNDJ TEST 13/> STUDIES: CPT | Mod: PBBFAC,PN | Performed by: PSYCHIATRY & NEUROLOGY

## 2021-03-24 PROCEDURE — 95886 MUSC TEST DONE W/N TEST COMP: CPT | Mod: PBBFAC,PN,50 | Performed by: PSYCHIATRY & NEUROLOGY

## 2021-03-24 PROCEDURE — 95886 MUSC TEST DONE W/N TEST COMP: CPT | Mod: 26,S$PBB,, | Performed by: PSYCHIATRY & NEUROLOGY

## 2021-03-24 PROCEDURE — 99999 PR PBB SHADOW E&M-EST. PATIENT-LVL III: ICD-10-PCS | Mod: PBBFAC,,, | Performed by: PSYCHIATRY & NEUROLOGY

## 2021-03-24 PROCEDURE — 95913 PR NERVE CONDUCTION STUDY; 13 OR MORE STUDIES: ICD-10-PCS | Mod: 26,S$PBB,, | Performed by: PSYCHIATRY & NEUROLOGY

## 2021-03-24 PROCEDURE — 99213 OFFICE O/P EST LOW 20 MIN: CPT | Mod: 25,S$PBB,, | Performed by: PSYCHIATRY & NEUROLOGY

## 2021-03-24 PROCEDURE — 95913 NRV CNDJ TEST 13/> STUDIES: CPT | Mod: 26,S$PBB,, | Performed by: PSYCHIATRY & NEUROLOGY

## 2021-04-16 ENCOUNTER — PATIENT MESSAGE (OUTPATIENT)
Dept: RESEARCH | Facility: HOSPITAL | Age: 36
End: 2021-04-16

## 2021-04-22 ENCOUNTER — OFFICE VISIT (OUTPATIENT)
Dept: NEUROLOGY | Facility: CLINIC | Age: 36
End: 2021-04-22
Payer: MEDICAID

## 2021-04-22 VITALS
HEART RATE: 68 BPM | SYSTOLIC BLOOD PRESSURE: 127 MMHG | WEIGHT: 113.75 LBS | HEIGHT: 58 IN | DIASTOLIC BLOOD PRESSURE: 84 MMHG | BODY MASS INDEX: 23.88 KG/M2

## 2021-04-22 DIAGNOSIS — E53.8 B12 DEFICIENCY: Primary | ICD-10-CM

## 2021-04-22 DIAGNOSIS — G43.009 MIGRAINE WITHOUT AURA AND WITHOUT STATUS MIGRAINOSUS, NOT INTRACTABLE: ICD-10-CM

## 2021-04-22 DIAGNOSIS — G56.01 CARPAL TUNNEL SYNDROME OF RIGHT WRIST: ICD-10-CM

## 2021-04-22 DIAGNOSIS — R11.2 NON-INTRACTABLE VOMITING WITH NAUSEA, UNSPECIFIED VOMITING TYPE: ICD-10-CM

## 2021-04-22 PROCEDURE — 99999 PR PBB SHADOW E&M-EST. PATIENT-LVL III: CPT | Mod: PBBFAC,,, | Performed by: PSYCHIATRY & NEUROLOGY

## 2021-04-22 PROCEDURE — 99214 OFFICE O/P EST MOD 30 MIN: CPT | Mod: S$PBB,,, | Performed by: PSYCHIATRY & NEUROLOGY

## 2021-04-22 PROCEDURE — 99214 PR OFFICE/OUTPT VISIT, EST, LEVL IV, 30-39 MIN: ICD-10-PCS | Mod: S$PBB,,, | Performed by: PSYCHIATRY & NEUROLOGY

## 2021-04-22 PROCEDURE — 99213 OFFICE O/P EST LOW 20 MIN: CPT | Mod: PBBFAC,PN | Performed by: PSYCHIATRY & NEUROLOGY

## 2021-04-22 PROCEDURE — 99999 PR PBB SHADOW E&M-EST. PATIENT-LVL III: ICD-10-PCS | Mod: PBBFAC,,, | Performed by: PSYCHIATRY & NEUROLOGY

## 2021-04-22 RX ORDER — VARENICLINE TARTRATE 1 MG/1
1 TABLET, FILM COATED ORAL 2 TIMES DAILY
COMMUNITY
Start: 2021-04-07

## 2021-04-22 RX ORDER — PROCHLORPERAZINE MALEATE 10 MG
10 TABLET ORAL 3 TIMES DAILY PRN
Qty: 21 TABLET | Refills: 0 | Status: SHIPPED | OUTPATIENT
Start: 2021-04-22

## 2021-04-22 RX ORDER — RIZATRIPTAN BENZOATE 10 MG/1
TABLET ORAL
Qty: 12 TABLET | Refills: 1 | Status: SHIPPED | OUTPATIENT
Start: 2021-04-22

## 2021-04-22 RX ORDER — CYANOCOBALAMIN 1000 UG/ML
1000 INJECTION, SOLUTION INTRAMUSCULAR; SUBCUTANEOUS
Status: SHIPPED | OUTPATIENT
Start: 2021-04-22

## 2021-08-23 ENCOUNTER — PATIENT MESSAGE (OUTPATIENT)
Dept: ORTHOPEDICS | Facility: CLINIC | Age: 36
End: 2021-08-23

## 2021-10-25 ENCOUNTER — TELEPHONE (OUTPATIENT)
Dept: TRANSPLANT | Facility: CLINIC | Age: 36
End: 2021-10-25
Payer: MEDICAID

## 2022-10-25 ENCOUNTER — HOSPITAL ENCOUNTER (EMERGENCY)
Facility: HOSPITAL | Age: 37
Discharge: HOME | End: 2022-10-25
Attending: EMERGENCY MEDICINE
Payer: MEDICAID

## 2022-10-25 VITALS
RESPIRATION RATE: 16 BRPM | SYSTOLIC BLOOD PRESSURE: 140 MMHG | HEART RATE: 88 BPM | WEIGHT: 98 LBS | DIASTOLIC BLOOD PRESSURE: 78 MMHG | TEMPERATURE: 98 F | OXYGEN SATURATION: 100 % | BODY MASS INDEX: 20.48 KG/M2

## 2022-10-25 DIAGNOSIS — K64.4 EXTERNAL HEMORRHOID: Primary | ICD-10-CM

## 2022-10-25 PROCEDURE — 99282 EMERGENCY DEPT VISIT SF MDM: CPT | Mod: ER

## 2022-10-25 RX ORDER — HYDROCORTISONE 25 MG/G
CREAM TOPICAL 2 TIMES DAILY
Qty: 20 G | Refills: 0 | Status: SHIPPED | OUTPATIENT
Start: 2022-10-25

## 2022-10-25 NOTE — Clinical Note
"Ruby Carbonechu Lebron was seen and treated in our emergency department on 10/25/2022.  She may return to work on 10/28/2022.       If you have any questions or concerns, please don't hesitate to call.      Mert Mcbride PA-C"

## 2022-10-26 NOTE — ED PROVIDER NOTES
"Encounter Date: 10/25/2022       History     Chief Complaint   Patient presents with    Rectal Pain     Pt states," I have a bump on my butt for three days. It hurts."     Chief Complaint:  Rectal pain  History of  Present Illness: History obtained from patient. This 37 y.o. female who has past medical history of endometriosis, hypertension presents to the ED complaining of rectal pain that began upon awakening 3 days ago.  Patient reports pain with bowel movements.  Denies melena, hematochezia, abdominal pain, nausea, vomiting, fever, urinary symptoms.  Patient denies having anal sex.  Denies history of hemorrhoids.    Review of patient's allergies indicates:   Allergen Reactions    Clindamycin Rash     Past Medical History:   Diagnosis Date    Abnormal Pap smear 04/06/10    mild dysplasia    Abnormal Pap smear of cervix     Cervical cancer     Endometriosis     Headache     Hypertension      Past Surgical History:   Procedure Laterality Date    CERVICAL BIOPSY  W/ LOOP ELECTRODE EXCISION  2010    Mild dysplasia     SECTION, LOW TRANSVERSE       SECTION, LOW TRANSVERSE      HYSTERECTOMY      LAPAROSCOPIC LYSIS OF ADHESIONS  8/15/2018    Procedure: LYSIS, ADHESIONS, LAPAROSCOPIC;  Surgeon: Donny Torres MD;  Location: [facility] OR;  Service: OB/GYN;;    LAPAROSCOPIC SALPINGECTOMY Bilateral 8/15/2018    Procedure: SALPINGECTOMY, LAPAROSCOPIC;  Surgeon: Donny Torres MD;  Location: [facility] OR;  Service: OB/GYN;  Laterality: Bilateral;    LAPAROSCOPIC TOTAL HYSTERECTOMY N/A 8/15/2018    Procedure: HYSTERECTOMY, TOTAL, LAPAROSCOPIC;  Surgeon: Donny Torres MD;  Location: [facility] OR;  Service: OB/GYN;  Laterality: N/A;  TF DR NGUYEN PER DR TORRES  RN PRE OP 8-10-18    PELVIC LAPAROSCOPY       Family History   Problem Relation Age of Onset    Hypertension Father     Hypertension Mother      Social History     Tobacco Use    Smoking status: Every Day     Packs/day: 0.50     Years: 13.00     Pack years: 6.50     " Types: Cigarettes    Smokeless tobacco: Never   Substance Use Topics    Alcohol use: No    Drug use: No     Review of Systems   Constitutional:  Negative for chills and fever.   HENT:  Negative for congestion, rhinorrhea and sore throat.    Eyes:  Negative for visual disturbance.   Respiratory:  Negative for cough and shortness of breath.    Cardiovascular:  Negative for chest pain.   Gastrointestinal:  Positive for rectal pain. Negative for abdominal pain, anal bleeding, blood in stool, diarrhea, nausea and vomiting.   Genitourinary:  Negative for dysuria, frequency and hematuria.   Musculoskeletal:  Negative for back pain.   Skin:  Negative for rash.   Neurological:  Negative for dizziness, weakness and headaches.     Physical Exam     Initial Vitals [10/25/22 1700]   BP Pulse Resp Temp SpO2   (!) 133/92 95 16 99 °F (37.2 °C) 99 %      MAP       --         Physical Exam    Nursing note and vitals reviewed.  Constitutional: She appears well-developed and well-nourished. No distress.   HENT:   Head: Normocephalic and atraumatic.   Right Ear: Tympanic membrane normal.   Left Ear: Tympanic membrane normal.   Nose: Nose normal.   Mouth/Throat: Uvula is midline, oropharynx is clear and moist and mucous membranes are normal.   Eyes: EOM are normal. Pupils are equal, round, and reactive to light.   Neck: Trachea normal and phonation normal. Neck supple. No stridor present.   Normal range of motion.   Full passive range of motion without pain.     Cardiovascular:  Normal rate, regular rhythm and normal heart sounds.     Exam reveals no gallop and no friction rub.       No murmur heard.  Pulmonary/Chest: Effort normal and breath sounds normal. No respiratory distress. She has no wheezes. She has no rhonchi. She has no rales.   Abdominal: Abdomen is soft. Bowel sounds are normal. There is no abdominal tenderness. There is no rebound and no guarding.   Genitourinary: Rectum:      External hemorrhoid present.       Genitourinary Comments: There is a non thrombosed external hemorrhoid     Musculoskeletal:         General: Normal range of motion.      Cervical back: Full passive range of motion without pain, normal range of motion and neck supple. No rigidity. No spinous process tenderness or muscular tenderness. Normal range of motion.     Neurological: She is alert and oriented to person, place, and time. She has normal strength. No cranial nerve deficit or sensory deficit.   Skin: Skin is warm and dry. Capillary refill takes less than 2 seconds.   Psychiatric: She has a normal mood and affect.       ED Course   Procedures  Labs Reviewed   POCT URINALYSIS W/O SCOPE          Imaging Results    None          Medications - No data to display  Medical Decision Making:   ED Management:  This is an evaluation of a 37 y.o. female who presents to the ED for rectal pain.  Vital signs are stable.   Afebrile.  Patient is nontoxic appearing and in no acute distress.  Abdomen is soft and nontender to palpation.  No rebound tenderness or guarding.  There is a non thrombosed external hemorrhoid.  No active bleeding.  Will discharge patient home with Anusol.  Encourage follow-up with PCP.  Provided patient with colorectal follow-up.    Patient given return precautions and instructed to return to the emergency department for any new or worsening symptoms. Patient verbalized understanding and agreed with plan. Encouraged follow-up with PCP.                          Clinical Impression:   Final diagnoses:  [K64.4] External hemorrhoid (Primary)        ED Disposition Condition    Discharge Stable          ED Prescriptions       Medication Sig Dispense Start Date End Date Auth. Provider    hydrocortisone 2.5 % cream Apply topically 2 (two) times daily. 20 g 10/25/2022 -- Mert Mcbride PA-C          Follow-up Information       Follow up With Specialties Details Why Contact Info    [facility]   [id number]  [facility] [id number]  [phone]    [facility] ED Emergency Medicine Go in 1 day If symptoms worsen [address] [facility] [id number] [phone]             Mert Mcbride PA-C  10/25/22 1902

## 2024-10-06 ENCOUNTER — HOSPITAL ENCOUNTER (INPATIENT)
Facility: HOSPITAL | Age: 39
LOS: 2 days | Discharge: HOME | DRG: 872 | End: 2024-10-08
Attending: EMERGENCY MEDICINE
Payer: COMMERCIAL

## 2024-10-06 DIAGNOSIS — N12 PYELONEPHRITIS: Primary | ICD-10-CM

## 2024-10-06 DIAGNOSIS — A41.9 SEPSIS: ICD-10-CM

## 2024-10-06 DIAGNOSIS — R07.9 CHEST PAIN: ICD-10-CM

## 2024-10-06 PROBLEM — F11.21 OPIOID USE DISORDER, SEVERE, IN SUSTAINED REMISSION: Chronic | Status: ACTIVE | Noted: 2024-10-06

## 2024-10-06 PROBLEM — I10 ESSENTIAL HYPERTENSION: Status: ACTIVE | Noted: 2020-09-04

## 2024-10-06 PROBLEM — R56.9 SEIZURE: Status: ACTIVE | Noted: 2020-09-04

## 2024-10-06 PROBLEM — N20.0 NEPHROLITHIASIS: Status: ACTIVE | Noted: 2024-10-06

## 2024-10-06 PROBLEM — N13.30 HYDRONEPHROSIS: Status: ACTIVE | Noted: 2024-10-06

## 2024-10-06 LAB
ALBUMIN SERPL BCP-MCNC: 3.4 G/DL (ref 3.5–5.2)
ALBUMIN SERPL-MCNC: 3.9 G/DL (ref 3.3–5.5)
ALLENS TEST: ABNORMAL
ALP SERPL-CCNC: 78 U/L (ref 55–135)
ALP SERPL-CCNC: 80 U/L (ref 42–141)
ALT SERPL W/O P-5'-P-CCNC: 23 U/L (ref 10–44)
ANION GAP SERPL CALC-SCNC: 11 MMOL/L (ref 8–16)
AST SERPL-CCNC: 39 U/L (ref 10–40)
B-HCG UR QL: NEGATIVE
BASOPHILS # BLD AUTO: 0.06 K/UL (ref 0–0.2)
BASOPHILS NFR BLD: 0.2 % (ref 0–1.9)
BILIRUB SERPL-MCNC: 0.4 MG/DL (ref 0.1–1)
BILIRUB SERPL-MCNC: 0.7 MG/DL (ref 0.2–1.6)
BILIRUBIN, POC UA: ABNORMAL
BLOOD, POC UA: ABNORMAL
BUN SERPL-MCNC: 13 MG/DL (ref 6–20)
BUN SERPL-MCNC: 15 MG/DL (ref 7–22)
CALCIUM SERPL-MCNC: 10.3 MG/DL (ref 8–10.3)
CALCIUM SERPL-MCNC: 9.2 MG/DL (ref 8.7–10.5)
CHLORIDE SERPL-SCNC: 101 MMOL/L (ref 98–108)
CHLORIDE SERPL-SCNC: 106 MMOL/L (ref 95–110)
CLARITY, UA: CLEAR
CO2 SERPL-SCNC: 21 MMOL/L (ref 23–29)
COLOR, UA: YELLOW
CREAT SERPL-MCNC: 0.7 MG/DL (ref 0.5–1.4)
CREAT SERPL-MCNC: 0.7 MG/DL (ref 0.6–1.2)
CTP QC/QA: YES
DIFFERENTIAL METHOD BLD: ABNORMAL
EOSINOPHIL # BLD AUTO: 0 K/UL (ref 0–0.5)
EOSINOPHIL NFR BLD: 0.1 % (ref 0–8)
ERYTHROCYTE [DISTWIDTH] IN BLOOD BY AUTOMATED COUNT: 12.9 % (ref 11.5–14.5)
EST. GFR  (NO RACE VARIABLE): >60 ML/MIN/1.73 M^2
GLUCOSE SERPL-MCNC: 100 MG/DL (ref 70–110)
GLUCOSE SERPL-MCNC: 168 MG/DL (ref 73–118)
GLUCOSE, POC UA: NEGATIVE
HCO3 UR-SCNC: 22.5 MMOL/L (ref 24–28)
HCT VFR BLD AUTO: 37.5 % (ref 37–48.5)
HCT, POC: NORMAL
HGB BLD-MCNC: 12.6 G/DL (ref 12–16)
HGB, POC: NORMAL (ref 14–18)
IMM GRANULOCYTES # BLD AUTO: 0.16 K/UL (ref 0–0.04)
IMM GRANULOCYTES NFR BLD AUTO: 0.7 % (ref 0–0.5)
KETONES, POC UA: ABNORMAL
LDH SERPL L TO P-CCNC: 0.72 MMOL/L (ref 0.5–2.2)
LEUKOCYTE EST, POC UA: NEGATIVE
LYMPHOCYTES # BLD AUTO: 2.4 K/UL (ref 1–4.8)
LYMPHOCYTES NFR BLD: 10 % (ref 18–48)
MAGNESIUM SERPL-MCNC: 1.9 MG/DL (ref 1.6–2.6)
MCH RBC QN AUTO: 30.8 PG (ref 27–31)
MCH, POC: NORMAL
MCHC RBC AUTO-ENTMCNC: 33.6 G/DL (ref 32–36)
MCHC, POC: NORMAL
MCV RBC AUTO: 92 FL (ref 82–98)
MCV, POC: NORMAL
MONOCYTES # BLD AUTO: 1.6 K/UL (ref 0.3–1)
MONOCYTES NFR BLD: 6.7 % (ref 4–15)
MPV, POC: NORMAL
NEUTROPHILS # BLD AUTO: 19.8 K/UL (ref 1.8–7.7)
NEUTROPHILS NFR BLD: 82.3 % (ref 38–73)
NITRITE, POC UA: NEGATIVE
NRBC BLD-RTO: 0 /100 WBC
PCO2 BLDA: 38 MMHG (ref 35–45)
PH SMN: 7.38 [PH] (ref 7.35–7.45)
PH UR STRIP: 5.5 [PH] (ref 5–8)
PLATELET # BLD AUTO: 224 K/UL (ref 150–450)
PMV BLD AUTO: 9.3 FL (ref 9.2–12.9)
PO2 BLDA: 42 MMHG (ref 40–60)
POC ALT (SGPT): 15 U/L (ref 10–47)
POC AST (SGOT): 24 U/L (ref 11–38)
POC B-TYPE NATRIURETIC PEPTIDE: 17.5 PG/ML (ref 0–100)
POC BE: -2 MMOL/L
POC CARDIAC TROPONIN I: 0.02 NG/ML (ref 0–0.08)
POC PLATELET COUNT: NORMAL
POC PTINR: 1.2 (ref 0.9–1.2)
POC PTWBT: 11.9 SEC (ref 9.7–14.3)
POC SATURATED O2: 77 % (ref 95–100)
POC TCO2: 24 MMOL/L (ref 24–29)
POC TCO2: 26 MMOL/L (ref 18–33)
POTASSIUM BLD-SCNC: 3.5 MMOL/L (ref 3.6–5.1)
POTASSIUM SERPL-SCNC: 4.3 MMOL/L (ref 3.5–5.1)
PROT SERPL-MCNC: 6.9 G/DL (ref 6–8.4)
PROTEIN, POC UA: ABNORMAL
PROTEIN, POC: 7.9 G/DL (ref 6.4–8.1)
RBC # BLD AUTO: 4.09 M/UL (ref 4–5.4)
RBC, POC: NORMAL
RDW, POC: NORMAL
SAMPLE: ABNORMAL
SAMPLE: NORMAL
SAMPLE: NORMAL
SITE: ABNORMAL
SODIUM BLD-SCNC: 134 MMOL/L (ref 128–145)
SODIUM SERPL-SCNC: 138 MMOL/L (ref 136–145)
SPECIFIC GRAVITY, POC UA: >=1.03 (ref 1–1.03)
UROBILINOGEN, POC UA: 0.2 E.U./DL
WBC # BLD AUTO: 24.07 K/UL (ref 3.9–12.7)
WBC, POC: NORMAL

## 2024-10-06 PROCEDURE — 87086 URINE CULTURE/COLONY COUNT: CPT

## 2024-10-06 PROCEDURE — 63600175 PHARM REV CODE 636 W HCPCS

## 2024-10-06 PROCEDURE — 84484 ASSAY OF TROPONIN QUANT: CPT | Mod: ER

## 2024-10-06 PROCEDURE — 36415 COLL VENOUS BLD VENIPUNCTURE: CPT

## 2024-10-06 PROCEDURE — 25000003 PHARM REV CODE 250: Performed by: HOSPITALIST

## 2024-10-06 PROCEDURE — 96361 HYDRATE IV INFUSION ADD-ON: CPT | Mod: ER

## 2024-10-06 PROCEDURE — 25000003 PHARM REV CODE 250: Mod: ER

## 2024-10-06 PROCEDURE — 80053 COMPREHEN METABOLIC PANEL: CPT | Mod: ER

## 2024-10-06 PROCEDURE — 83605 ASSAY OF LACTIC ACID: CPT | Mod: ER

## 2024-10-06 PROCEDURE — 99285 EMERGENCY DEPT VISIT HI MDM: CPT | Mod: 25,ER

## 2024-10-06 PROCEDURE — 81025 URINE PREGNANCY TEST: CPT | Mod: ER

## 2024-10-06 PROCEDURE — 87040 BLOOD CULTURE FOR BACTERIA: CPT

## 2024-10-06 PROCEDURE — 80053 COMPREHEN METABOLIC PANEL: CPT

## 2024-10-06 PROCEDURE — 85025 COMPLETE CBC W/AUTO DIFF WBC: CPT | Mod: ER

## 2024-10-06 PROCEDURE — 96375 TX/PRO/DX INJ NEW DRUG ADDON: CPT | Mod: ER

## 2024-10-06 PROCEDURE — 93010 ELECTROCARDIOGRAM REPORT: CPT | Mod: ,,, | Performed by: INTERNAL MEDICINE

## 2024-10-06 PROCEDURE — 63600175 PHARM REV CODE 636 W HCPCS: Mod: ER

## 2024-10-06 PROCEDURE — 93005 ELECTROCARDIOGRAM TRACING: CPT | Mod: ER

## 2024-10-06 PROCEDURE — 63600175 PHARM REV CODE 636 W HCPCS: Performed by: HOSPITALIST

## 2024-10-06 PROCEDURE — 11000001 HC ACUTE MED/SURG PRIVATE ROOM

## 2024-10-06 PROCEDURE — 96365 THER/PROPH/DIAG IV INF INIT: CPT | Mod: ER

## 2024-10-06 PROCEDURE — 82803 BLOOD GASES ANY COMBINATION: CPT | Mod: ER

## 2024-10-06 PROCEDURE — 25000003 PHARM REV CODE 250

## 2024-10-06 PROCEDURE — 85025 COMPLETE CBC W/AUTO DIFF WBC: CPT

## 2024-10-06 PROCEDURE — 83735 ASSAY OF MAGNESIUM: CPT

## 2024-10-06 PROCEDURE — 21400001 HC TELEMETRY ROOM

## 2024-10-06 PROCEDURE — 83880 ASSAY OF NATRIURETIC PEPTIDE: CPT | Mod: ER

## 2024-10-06 RX ORDER — ONDANSETRON HYDROCHLORIDE 2 MG/ML
4 INJECTION, SOLUTION INTRAVENOUS
Status: COMPLETED | OUTPATIENT
Start: 2024-10-06 | End: 2024-10-06

## 2024-10-06 RX ORDER — SODIUM CHLORIDE 0.9 % (FLUSH) 0.9 %
10 SYRINGE (ML) INJECTION EVERY 12 HOURS PRN
Status: DISCONTINUED | OUTPATIENT
Start: 2024-10-06 | End: 2024-10-08 | Stop reason: HOSPADM

## 2024-10-06 RX ORDER — SODIUM CHLORIDE, SODIUM LACTATE, POTASSIUM CHLORIDE, CALCIUM CHLORIDE 600; 310; 30; 20 MG/100ML; MG/100ML; MG/100ML; MG/100ML
INJECTION, SOLUTION INTRAVENOUS CONTINUOUS
Status: DISCONTINUED | OUTPATIENT
Start: 2024-10-06 | End: 2024-10-08 | Stop reason: HOSPADM

## 2024-10-06 RX ORDER — ONDANSETRON HYDROCHLORIDE 2 MG/ML
4 INJECTION, SOLUTION INTRAVENOUS EVERY 8 HOURS PRN
Status: DISCONTINUED | OUTPATIENT
Start: 2024-10-06 | End: 2024-10-08 | Stop reason: HOSPADM

## 2024-10-06 RX ORDER — NALOXONE HCL 0.4 MG/ML
0.02 VIAL (ML) INJECTION
Status: DISCONTINUED | OUTPATIENT
Start: 2024-10-06 | End: 2024-10-08 | Stop reason: HOSPADM

## 2024-10-06 RX ORDER — HYDROMORPHONE HYDROCHLORIDE 1 MG/ML
1 INJECTION, SOLUTION INTRAMUSCULAR; INTRAVENOUS; SUBCUTANEOUS ONCE
Status: CANCELLED | OUTPATIENT
Start: 2024-10-06

## 2024-10-06 RX ORDER — KETOROLAC TROMETHAMINE 30 MG/ML
15 INJECTION, SOLUTION INTRAMUSCULAR; INTRAVENOUS
Status: COMPLETED | OUTPATIENT
Start: 2024-10-06 | End: 2024-10-06

## 2024-10-06 RX ORDER — ACETAMINOPHEN 325 MG/1
650 TABLET ORAL EVERY 4 HOURS PRN
Status: DISCONTINUED | OUTPATIENT
Start: 2024-10-06 | End: 2024-10-08 | Stop reason: HOSPADM

## 2024-10-06 RX ORDER — MORPHINE SULFATE 4 MG/ML
1 INJECTION, SOLUTION INTRAMUSCULAR; INTRAVENOUS ONCE
Status: DISCONTINUED | OUTPATIENT
Start: 2024-10-06 | End: 2024-10-06

## 2024-10-06 RX ORDER — MORPHINE SULFATE 4 MG/ML
4 INJECTION, SOLUTION INTRAMUSCULAR; INTRAVENOUS
Status: COMPLETED | OUTPATIENT
Start: 2024-10-06 | End: 2024-10-06

## 2024-10-06 RX ORDER — TALC
6 POWDER (GRAM) TOPICAL NIGHTLY PRN
Status: DISCONTINUED | OUTPATIENT
Start: 2024-10-06 | End: 2024-10-08 | Stop reason: HOSPADM

## 2024-10-06 RX ORDER — HYDROMORPHONE HYDROCHLORIDE 1 MG/ML
0.5 INJECTION, SOLUTION INTRAMUSCULAR; INTRAVENOUS; SUBCUTANEOUS ONCE
Status: COMPLETED | OUTPATIENT
Start: 2024-10-06 | End: 2024-10-06

## 2024-10-06 RX ORDER — IBUPROFEN 200 MG
16 TABLET ORAL
Status: DISCONTINUED | OUTPATIENT
Start: 2024-10-06 | End: 2024-10-08 | Stop reason: HOSPADM

## 2024-10-06 RX ORDER — DEXTROMETHORPHAN POLISTIREX 30 MG/5 ML
1 SUSPENSION, EXTENDED RELEASE 12 HR ORAL DAILY PRN
Status: DISCONTINUED | OUTPATIENT
Start: 2024-10-06 | End: 2024-10-08 | Stop reason: HOSPADM

## 2024-10-06 RX ORDER — HYDROMORPHONE HYDROCHLORIDE 1 MG/ML
0.2 INJECTION, SOLUTION INTRAMUSCULAR; INTRAVENOUS; SUBCUTANEOUS EVERY 4 HOURS PRN
Status: DISCONTINUED | OUTPATIENT
Start: 2024-10-06 | End: 2024-10-07

## 2024-10-06 RX ORDER — ALUMINUM HYDROXIDE, MAGNESIUM HYDROXIDE, AND SIMETHICONE 1200; 120; 1200 MG/30ML; MG/30ML; MG/30ML
30 SUSPENSION ORAL 4 TIMES DAILY PRN
Status: DISCONTINUED | OUTPATIENT
Start: 2024-10-06 | End: 2024-10-08 | Stop reason: HOSPADM

## 2024-10-06 RX ORDER — HYDROMORPHONE HYDROCHLORIDE 1 MG/ML
1 INJECTION, SOLUTION INTRAMUSCULAR; INTRAVENOUS; SUBCUTANEOUS EVERY 6 HOURS PRN
Status: DISCONTINUED | OUTPATIENT
Start: 2024-10-06 | End: 2024-10-07

## 2024-10-06 RX ORDER — ALBUTEROL SULFATE 2.5 MG/.5ML
2.5 SOLUTION RESPIRATORY (INHALATION) EVERY 4 HOURS PRN
Status: DISCONTINUED | OUTPATIENT
Start: 2024-10-06 | End: 2024-10-08 | Stop reason: HOSPADM

## 2024-10-06 RX ORDER — HYDROMORPHONE HYDROCHLORIDE 1 MG/ML
1 INJECTION, SOLUTION INTRAMUSCULAR; INTRAVENOUS; SUBCUTANEOUS ONCE
Status: COMPLETED | OUTPATIENT
Start: 2024-10-06 | End: 2024-10-06

## 2024-10-06 RX ORDER — SUMATRIPTAN SUCCINATE 25 MG/1
50 TABLET ORAL
Status: DISCONTINUED | OUTPATIENT
Start: 2024-10-06 | End: 2024-10-08 | Stop reason: HOSPADM

## 2024-10-06 RX ORDER — IBUPROFEN 200 MG
24 TABLET ORAL
Status: DISCONTINUED | OUTPATIENT
Start: 2024-10-06 | End: 2024-10-08 | Stop reason: HOSPADM

## 2024-10-06 RX ORDER — ACETAMINOPHEN 325 MG/1
650 TABLET ORAL EVERY 8 HOURS PRN
Status: DISCONTINUED | OUTPATIENT
Start: 2024-10-06 | End: 2024-10-08 | Stop reason: HOSPADM

## 2024-10-06 RX ORDER — SODIUM CHLORIDE 9 MG/ML
1000 INJECTION, SOLUTION INTRAVENOUS
Status: COMPLETED | OUTPATIENT
Start: 2024-10-06 | End: 2024-10-06

## 2024-10-06 RX ORDER — POLYETHYLENE GLYCOL 3350 17 G/17G
17 POWDER, FOR SOLUTION ORAL DAILY
Status: DISCONTINUED | OUTPATIENT
Start: 2024-10-07 | End: 2024-10-08 | Stop reason: HOSPADM

## 2024-10-06 RX ORDER — ESCITALOPRAM OXALATE 10 MG/1
20 TABLET ORAL DAILY
Status: DISCONTINUED | OUTPATIENT
Start: 2024-10-07 | End: 2024-10-08 | Stop reason: HOSPADM

## 2024-10-06 RX ORDER — PROCHLORPERAZINE EDISYLATE 5 MG/ML
5 INJECTION INTRAMUSCULAR; INTRAVENOUS EVERY 6 HOURS PRN
Status: DISCONTINUED | OUTPATIENT
Start: 2024-10-06 | End: 2024-10-08 | Stop reason: HOSPADM

## 2024-10-06 RX ORDER — POTASSIUM CHLORIDE 20 MEQ/1
40 TABLET, EXTENDED RELEASE ORAL
Status: DISCONTINUED | OUTPATIENT
Start: 2024-10-06 | End: 2024-10-06

## 2024-10-06 RX ORDER — GLUCAGON 1 MG
1 KIT INJECTION
Status: DISCONTINUED | OUTPATIENT
Start: 2024-10-06 | End: 2024-10-08 | Stop reason: HOSPADM

## 2024-10-06 RX ADMIN — SODIUM CHLORIDE, POTASSIUM CHLORIDE, SODIUM LACTATE AND CALCIUM CHLORIDE 1197 ML: 600; 310; 30; 20 INJECTION, SOLUTION INTRAVENOUS at 03:10

## 2024-10-06 RX ADMIN — CEFTRIAXONE 2 G: 2 INJECTION, POWDER, FOR SOLUTION INTRAMUSCULAR; INTRAVENOUS at 03:10

## 2024-10-06 RX ADMIN — SODIUM CHLORIDE 1000 ML: 9 INJECTION, SOLUTION INTRAVENOUS at 02:10

## 2024-10-06 RX ADMIN — POTASSIUM BICARBONATE 25 MEQ: 978 TABLET, EFFERVESCENT ORAL at 03:10

## 2024-10-06 RX ADMIN — ONDANSETRON 4 MG: 2 INJECTION INTRAMUSCULAR; INTRAVENOUS at 07:10

## 2024-10-06 RX ADMIN — ACETAMINOPHEN 650 MG: 325 TABLET ORAL at 06:10

## 2024-10-06 RX ADMIN — MEROPENEM 1 G: 1 INJECTION INTRAVENOUS at 08:10

## 2024-10-06 RX ADMIN — HYDROMORPHONE HYDROCHLORIDE 0.5 MG: 1 INJECTION, SOLUTION INTRAMUSCULAR; INTRAVENOUS; SUBCUTANEOUS at 09:10

## 2024-10-06 RX ADMIN — VANCOMYCIN HYDROCHLORIDE 1000 MG: 1 INJECTION, POWDER, LYOPHILIZED, FOR SOLUTION INTRAVENOUS at 06:10

## 2024-10-06 RX ADMIN — MORPHINE SULFATE 4 MG: 4 INJECTION, SOLUTION INTRAMUSCULAR; INTRAVENOUS at 04:10

## 2024-10-06 RX ADMIN — SODIUM CHLORIDE, POTASSIUM CHLORIDE, SODIUM LACTATE AND CALCIUM CHLORIDE: 600; 310; 30; 20 INJECTION, SOLUTION INTRAVENOUS at 09:10

## 2024-10-06 RX ADMIN — BUSPIRONE HYDROCHLORIDE 15 MG: 10 TABLET ORAL at 09:10

## 2024-10-06 RX ADMIN — ONDANSETRON 4 MG: 2 INJECTION INTRAMUSCULAR; INTRAVENOUS at 02:10

## 2024-10-06 RX ADMIN — KETOROLAC TROMETHAMINE 15 MG: 30 INJECTION, SOLUTION INTRAMUSCULAR at 02:10

## 2024-10-06 RX ADMIN — HYDROMORPHONE HYDROCHLORIDE 1 MG: 1 INJECTION, SOLUTION INTRAMUSCULAR; INTRAVENOUS; SUBCUTANEOUS at 07:10

## 2024-10-06 NOTE — ASSESSMENT & PLAN NOTE
"Nephrolithiasis  Hydronephrosis  Sepsis  Opioid use disorder in sustained remission    39F w Hx of Pyelonephritis in 2023 that required ICU stay at [facility] and of Endometriosis s/p laparoscopic hysterectomy in 2018 p/w atraumatic nonradiating right flank pain with nausea and vomiting x 2 days. She does also report subjective fever/chills. She reports urine has been darker than normal and foul-smelling but denies any specific urinary symptoms.  Said she felt "like I'm getting septic like I was before."    CT Renal stone: Kidneys/ Ureters: 6 mm nonobstructing renal stone on the left.  No stones on the right.  Diminished image resolution and diminished visualization with suboptimal image quality.  The lack of thin-section imaging significantly diminishes the sensitivity.  5 mm images are submitted.  Possible mild hydronephrosis on the right.  Possible mild edema in the right kidney could be associated with mild inflammatory or infectious process.    Shortly after arrival to [facility] spiked a 103.1 fever.  Tachycardic.  Appeared ill, not toxic    Case d/w on-call Urology attending; assistance is greatly appreciated.  ABX per recs and consult placed for the AM and will contact IR STAT for R nephrostomy tube if she decompensates in the interim.  Vanc + meropenem given her spiking a 103.1 fever and appearing almost toxic on arrival to [facility]  UCX pending, will de-escalate ABX as soon as possible  When ABX de-escalated to PO regimen, will need pyelo-based dosing of meds (eg Cipro 750 BID instead of 500 BID)  Maintenance IVF running until she can tolerate PO  IV Zofran  Dilaudid for pain, will transition to PO Oxy with Dilaudid for breakthrough as soon as she can tolerate PO and from opioids to NSAIDs/Tylenol ASAP  Continue home Suboxone per the recs of the [employer] on Opioid Use Disorder, since do not anticipate her needing opioids for pain control more than 2-3 days.  See Reg Anesth Pain Med. 2021;46(10):840. " Epub 2021 Aug 12.

## 2024-10-06 NOTE — ED PROVIDER NOTES
Encounter Date: 10/6/2024       History     Chief Complaint   Patient presents with    Fever    Vomiting    Back Pain     Right flank pain, onset Friday.      Patient is a 39-year-old female with a past medical history of hypertension, headaches, endometriosis who presents to the emergency department for evaluation of atraumatic nonradiating right flank pain x 2 days.  She reports associated fever, chills, nausea with vomiting.  Reports approximately 5 episodes of vomiting.  She reports her urine has been darker and foul-smelling.  However, she denies dysuria, hematuria, urinary frequency.  She does report she does not drink lots of water.  Reports drinking lots of Coca-Cola.  She works at [employer].  Denies working or being in the heat for prolonged periods of time.  No generalized muscle cramping.  Denies abdominal pain.  Denies headache, lightheadedness, dizziness, chest pain, shortness of breath.    Upon chart review, patient had similar symptoms 1 year ago and was admitted for sepsis due to pyelonephritis.  Patient reports this as well.    The history is provided by the patient.     Review of patient's allergies indicates:   Allergen Reactions    Clindamycin Rash     Past Medical History:   Diagnosis Date    Abnormal Pap smear 04/06/10    mild dysplasia    Abnormal Pap smear of cervix     Cervical cancer     Endometriosis     Headache     Hypertension      Past Surgical History:   Procedure Laterality Date    CERVICAL BIOPSY  W/ LOOP ELECTRODE EXCISION  2010    Mild dysplasia     SECTION, LOW TRANSVERSE       SECTION, LOW TRANSVERSE      HYSTERECTOMY      LAPAROSCOPIC LYSIS OF ADHESIONS  8/15/2018    Procedure: LYSIS, ADHESIONS, LAPAROSCOPIC;  Surgeon: Donny Damon MD;  Location: [facility] OR;  Service: OB/GYN;;    LAPAROSCOPIC SALPINGECTOMY Bilateral 8/15/2018    Procedure: SALPINGECTOMY, LAPAROSCOPIC;  Surgeon: Donny Damon MD;  Location: [facility] OR;  Service: OB/GYN;  Laterality:  Bilateral;    LAPAROSCOPIC TOTAL HYSTERECTOMY N/A 8/15/2018    Procedure: HYSTERECTOMY, TOTAL, LAPAROSCOPIC;  Surgeon: Donny Torres MD;  Location: [facility] OR;  Service: OB/GYN;  Laterality: N/A;  TF DR NGUYEN PER DR TORRES  RN PRE OP 8-10-18    PELVIC LAPAROSCOPY       Family History   Problem Relation Name Age of Onset    Hypertension Father      Hypertension Mother       Social History     Tobacco Use    Smoking status: Every Day     Current packs/day: 0.50     Average packs/day: 0.5 packs/day for 13.0 years (6.5 ttl pk-yrs)     Types: Cigarettes    Smokeless tobacco: Never   Substance Use Topics    Alcohol use: No    Drug use: No     Review of Systems   Constitutional:  Negative for chills and fever.   Respiratory:  Negative for shortness of breath.    Cardiovascular:  Negative for chest pain.   Gastrointestinal:  Positive for nausea and vomiting. Negative for abdominal pain, constipation and diarrhea.   Genitourinary:  Positive for flank pain. Negative for dysuria, frequency, hematuria, vaginal bleeding and vaginal discharge.   Musculoskeletal:  Negative for neck pain and neck stiffness.   Neurological:  Negative for dizziness, light-headedness and headaches.       Physical Exam     Initial Vitals [10/06/24 1414]   BP Pulse Resp Temp SpO2   127/79 (!) 116 20 98.4 °F (36.9 °C) 97 %      MAP       --         Physical Exam    Nursing note and vitals reviewed.  Constitutional: She appears well-developed and well-nourished.   HENT:   Head: Normocephalic and atraumatic.   Right Ear: External ear normal.   Left Ear: External ear normal.   Neck: Carotid bruit is not present.   Normal range of motion.  Cardiovascular:  Regular rhythm, normal heart sounds and intact distal pulses.   Tachycardia present.   Exam reveals no gallop and no friction rub.       No murmur heard.  Pulmonary/Chest: Breath sounds normal. No respiratory distress. She has no wheezes. She has no rhonchi. She has no rales.   Abdominal: Abdomen is soft.  Bowel sounds are normal. She exhibits no distension. There is no abdominal tenderness.   There is right CVA tenderness.There is no rebound and no guarding.   Musculoskeletal:         General: Normal range of motion.      Cervical back: Normal range of motion.     Neurological: She is alert and oriented to person, place, and time. GCS score is 15. GCS eye subscore is 4. GCS verbal subscore is 5. GCS motor subscore is 6.   Psychiatric: She has a normal mood and affect.         ED Course   Procedures  Labs Reviewed   POCT URINALYSIS W/O SCOPE - Abnormal       Result Value    Glucose, UA Negative      Bilirubin, UA 1+ (*)     Ketones, UA 2+ (*)     Spec Grav UA >=1.030 (*)     Blood, UA 2+ (*)     PH, UA 5.5      Protein, UA 2+ (*)     Urobilinogen, UA 0.2      Nitrite, UA Negative      Leukocytes, UA Negative      Color, UA POC Yellow      Clarity, UA, POC Clear     POCT CMP - Abnormal    Albumin, POC 3.9      Alkaline Phosphatase, POC 80      ALT (SGPT), POC 15      AST (SGOT), POC 24      POC BUN 15      Calcium, POC 10.3      POC Chloride 101      POC Creatinine 0.7      POC Glucose 168 (*)     POC Potassium 3.5 (*)     POC Sodium 134      Bilirubin, POC 0.7      POC TCO2 26      Protein, POC 7.9     ISTAT PROCEDURE - Abnormal    POC PH 7.381      POC PCO2 38.0      POC PO2 42      POC HCO3 22.5 (*)     POC BE -2      POC SATURATED O2 77      POC Lactate 0.72      POC TCO2 24      Sample VENOUS      Site Other      Allens Test N/A     CULTURE, URINE   CULTURE, BLOOD   CULTURE, BLOOD   TROPONIN ISTAT    POC Cardiac Troponin I 0.02      Sample unknown     POCT URINALYSIS W/O SCOPE   POCT CBC    Hematocrit        Hemoglobin        RBC        WBC        MCV        MCH, POC        MCHC        RDW-CV        Platelet Count, POC        MPV       POCT URINE PREGNANCY    POC Preg Test, Ur Negative       Acceptable Yes     POCT CMP   POCT PROTIME-INR   POCT B-TYPE NATRIURETIC PEPTIDE (BNP)   POCT TROPONIN   POCT  B-TYPE NATRIURETIC PEPTIDE (BNP)    POC B-Type Natriuretic Peptide 17.5     ISTAT PROCEDURE    POC PTWBT 11.9      POC PTINR 1.2      Sample unknown       EKG Readings: (Independently Interpreted)   Initial Reading: No STEMI. Rhythm: Normal Sinus Rhythm.   normal sinus rhythm, ventricular rate of 83, normal QTC, no acute infarction.       Imaging Results              X-Ray Chest AP Portable (Final result)  Result time 10/06/24 16:08:34      Final result by Lona Bill MD (10/06/24 16:08:34)                   Impression:      No acute abnormality.      Electronically signed by: Lona Bill MD  Date:    10/06/2024  Time:    16:08               Narrative:    EXAMINATION:  XR CHEST AP PORTABLE    CLINICAL HISTORY:  Sepsis;    TECHNIQUE:  Single frontal view of the chest was performed.    COMPARISON:  08/10/2018    FINDINGS:  The lungs are clear with normal appearance of pulmonary vasculature. No pleural effusion. No evident pneumothorax.    The cardiac silhouette is normal in size. The hilar and mediastinal contours are unremarkable.    Bones are intact.                                        CT Renal Stone Study ABD Pelvis WO (Final result)  Result time 10/06/24 15:20:03      Final result by Bolivar Aguirre MD (10/06/24 15:20:03)                   Impression:      1. Slight indistinction of the right kidney with possible mild right hydronephrosis..  Mild inflammatory or infectious process on the right is a consideration.  No stones are detected on the right though visualization and sensitivity is diminished.  Recommend follow-up.  2. Hepatomegaly.  3. Possible constipation.  4. Nonobstructing nephrolithiasis of the left kidney.  5. This report was flagged in Epic as abnormal.      Electronically signed by: Bolivar Aguirre  Date:    10/06/2024  Time:    15:20               Narrative:    EXAMINATION:  CT ABD PELVIS WO    CLINICAL HISTORY:  Flank pain, kidney stone suspected;    TECHNIQUE:  Low dose axial  images, sagittal and coronal reformations were obtained from the lung bases to the pubic symphysis.  Contrast was not administered.  5 mm slice thickness.    COMPARISON:  None    FINDINGS:  Diminished image resolution.    Heart: Normal in size. No pericardial effusion.    Lung Bases: Well aerated, without consolidation or pleural fluid.    Liver: The liver is enlarged no focal abnormality.    Gallbladder: No calcified gallstones.    Bile Ducts: No evidence of dilated ducts.    Pancreas: No mass or peripancreatic fat stranding.    Spleen: Unremarkable.    Adrenals: Unremarkable.    Kidneys/ Ureters: 6 mm nonobstructing renal stone on the left.  No stones on the right.  Diminished image resolution and diminished visualization with suboptimal image quality.    The lack of thin-section imaging significantly diminishes the sensitivity.  5 mm images are submitted.    Possible mild hydronephrosis on the right.  Possible mild edema in the right kidney could be associated with mild inflammatory or infectious process.    No hydroureter is detected on limited visualization.    Bladder: No evidence of wall thickening.    Reproductive organs: Status post hysterectomy.    GI Tract/Mesentery: No evidence of bowel obstruction or inflammation.    Peritoneal Space: No ascites. No free air.    Retroperitoneum: No significant adenopathy.    Abdominal wall: Unremarkable.    Vasculature: No significant atherosclerosis or aneurysm.    Bones: No acute fracture.    The appendix is within normal limits.    Moderate retained feces in the colon.  Mild gaseous distention of the rectum and colon also.                                       Medications   lactated ringers bolus 1,197 mL (1,197 mLs Intravenous New Bag 10/6/24 9842)   sodium chloride 0.9% flush 10 mL (has no administration in time range)   melatonin tablet 6 mg (has no administration in time range)   ondansetron injection 4 mg (has no administration in time range)   prochlorperazine  injection Soln 5 mg (has no administration in time range)   polyethylene glycol packet 17 g (has no administration in time range)   mineral oil enema 1 enema (has no administration in time range)   acetaminophen tablet 650 mg (has no administration in time range)   aluminum-magnesium hydroxide-simethicone 200-200-20 mg/5 mL suspension 30 mL (has no administration in time range)   acetaminophen tablet 650 mg (has no administration in time range)   naloxone 0.4 mg/mL injection 0.02 mg (has no administration in time range)   glucose chewable tablet 16 g (has no administration in time range)   glucose chewable tablet 24 g (has no administration in time range)   glucagon (human recombinant) injection 1 mg (has no administration in time range)   0.9%  NaCl infusion (0 mLs Intravenous Stopped 10/6/24 1531)   ketorolac injection 15 mg (15 mg Intravenous Given 10/6/24 1440)   ondansetron injection 4 mg (4 mg Intravenous Given 10/6/24 1441)   potassium bicarbonate disintegrating tablet 25 mEq (25 mEq Oral Given 10/6/24 1554)   cefTRIAXone (ROCEPHIN) 2 g in D5W 100 mL IVPB (MB+) (2 g Intravenous New Bag 10/6/24 1555)   morphine injection 4 mg (4 mg Intravenous Given 10/6/24 1614)     Medical Decision Making  This is an emergent evaluation of a  39-year-old female with a past medical history of hypertension, headaches, endometriosis who presents to the emergency department for evaluation of atraumatic nonradiating right flank pain x 2 days.  She reports associated fever, chills, nausea with vomiting.    Patient looks well clinically. Regular rhythm, slightly tachycardic, without murmurs.  No carotid bruits appreciated on exam. Lungs are clear to auscultation bilaterally.  Abdomen is soft, nontender, non distended, with normal bowel sounds.  There is right-sided CVA tenderness present.    Differential diagnosis includes but is not limited to UTI, pyelonephritis, ureterolithiasis, sepsis, acute kidney injury, dehydration,  electrolyte derangement.      Workup initiated with basic labs, urinalysis, UPT, CT renal stone study.  Ordered fluids, Zofran, Toradol.  Vital signs, chart, labs, and/or imaging were all reviewed.  See ED course below and interpretations above. My overall impression is pyelonephritis, sepsis.  Patient will require further care in the hospital.  She is in agreement to coming in.  Patient was accepted by hospital medicine. My attending physician is also in agreement with my   Alfonso Gonzalez PA-C    DISCLAIMER: This note was prepared with [employer] voice recognition transcription software. Garbled syntax, mangled pronouns, and other bizarre constructions may be attributed to that software system.       Amount and/or Complexity of Data Reviewed  Labs: ordered. Decision-making details documented in ED Course.  Radiology: ordered. Decision-making details documented in ED Course.    Risk  Prescription drug management.  Decision regarding hospitalization.               ED Course as of 10/06/24 1633   Sun Oct 06, 2024   1416 BP: 127/79 [TM]   1416 Temp: 98.4 °F (36.9 °C) [TM]   1416 Pulse(!): 116 [TM]   1416 Resp: 20 [TM]   1416 SpO2: 97 % [TM]   1503 POCT URINALYSIS W/O SCOPE(!)  Urinalysis showing no signs of infection, negative for nitrites or leukocytes.  2+ protein.  2+ blood.  Elevated specific gravity, 2+ ketones consistent with dehydration.  Ordered urine culture. [TM]   1504 POCT CBC  CBC with leukocytosis of 25.8, no anemia.  Normal platelet count. [TM]   1504 POCT urine pregnancy  UPT negative. [TM]   1504 POCT CMP(!)  CMP with normal renal function, glucose of 168, potassium of 3.5, no other acute abnormalities.  No hepatorenal syndrome. [TM]   1521 Ordered Potassium supplementation. [TM]   1533 CT Renal Stone Study ABD Pelvis WO(!)  1. Slight indistinction of the right kidney with possible mild right hydronephrosis..  Mild inflammatory or infectious process on the right is a consideration.  No stones are detected  on the right though visualization and sensitivity is diminished.  Recommend follow-up.  2. Hepatomegaly.  3. Possible constipation.  4. Nonobstructing nephrolithiasis of the left kidney.  5. This report was flagged in Epic as abnormal.      [TM]   1533 Ordered 2 g of Rocephin. [TM]   1533 Remaining sepsis orders placed.  Patient now meets sepsis criteria with heart rate greater than 90, her white blood cell count of 25, present source of infection. [TM]   1602 ISTAT PROCEDURE(!)  VBG with normal pH, normal lactate. [TM]   1602 POCT B-type natriuretic peptide (BNP)  BNP within normal limits. [TM]   1602 EKG showing normal sinus rhythm, ventricular rate of 83, normal QTC, no acute infarction. [TM]   1614 Troponin ISTAT  Negative troponin. [TM]   1614 X-Ray Chest AP Portable  The lungs are clear with normal appearance of pulmonary vasculature. No pleural effusion. No evident pneumothorax.     The cardiac silhouette is normal in size. The hilar and mediastinal contours are unremarkable.     Bones are intact.   [TM]   1614 Will reach out to start admission process. [TM]   1628 Patient was accepted by hospitalist physician Dr. Mcpherson for admission for pyelonephritis and sepsis. [TM]   1630 I have also consulted my attending physician, Dr. Walton, who is aware of this patient and agrees with the admission for further care in the inpatient setting.  Patient also in agreement to come into the hospital for further care.  All orders have been placed.  Patient is stable for discharge at this time. [TM]      ED Course User Index  [TM] Alfonso Gonzalez PA-C                           Clinical Impression:  Final diagnoses:  [A41.9] Sepsis  [N12] Pyelonephritis (Primary)          ED Disposition Condition    Admit                 Alfonso Gonzalez PA-C  10/06/24 1633

## 2024-10-06 NOTE — NURSING
----- Message from Ezio Reza MD sent at 5/7/2019  1:44 PM CDT -----  No evident kidney stone. Back pain is almost certainly secondary to muscle spasm   Arrived to room 440 via stretcher, AAOx4, complaining of chills - T 99.6. Prn po Tylenol given, Scheduled IV Vanc started.

## 2024-10-06 NOTE — ED NOTES
Report given to [facility]. No questions asked. Waiting on transfer.

## 2024-10-07 LAB
ALBUMIN SERPL BCP-MCNC: 2.7 G/DL (ref 3.5–5.2)
ALP SERPL-CCNC: 77 U/L (ref 55–135)
ALT SERPL W/O P-5'-P-CCNC: 25 U/L (ref 10–44)
ANION GAP SERPL CALC-SCNC: 9 MMOL/L (ref 8–16)
AST SERPL-CCNC: 26 U/L (ref 10–40)
BASOPHILS # BLD AUTO: 0.04 K/UL (ref 0–0.2)
BASOPHILS NFR BLD: 0.2 % (ref 0–1.9)
BILIRUB SERPL-MCNC: 0.3 MG/DL (ref 0.1–1)
BUN SERPL-MCNC: 10 MG/DL (ref 6–20)
CALCIUM SERPL-MCNC: 8.4 MG/DL (ref 8.7–10.5)
CHLORIDE SERPL-SCNC: 104 MMOL/L (ref 95–110)
CO2 SERPL-SCNC: 25 MMOL/L (ref 23–29)
CREAT SERPL-MCNC: 0.6 MG/DL (ref 0.5–1.4)
DIFFERENTIAL METHOD BLD: ABNORMAL
EOSINOPHIL # BLD AUTO: 0.1 K/UL (ref 0–0.5)
EOSINOPHIL NFR BLD: 0.4 % (ref 0–8)
ERYTHROCYTE [DISTWIDTH] IN BLOOD BY AUTOMATED COUNT: 12.9 % (ref 11.5–14.5)
EST. GFR  (NO RACE VARIABLE): >60 ML/MIN/1.73 M^2
GLUCOSE SERPL-MCNC: 107 MG/DL (ref 70–110)
HCT VFR BLD AUTO: 31.3 % (ref 37–48.5)
HGB BLD-MCNC: 10 G/DL (ref 12–16)
IMM GRANULOCYTES # BLD AUTO: 0.11 K/UL (ref 0–0.04)
IMM GRANULOCYTES NFR BLD AUTO: 0.7 % (ref 0–0.5)
LYMPHOCYTES # BLD AUTO: 2.6 K/UL (ref 1–4.8)
LYMPHOCYTES NFR BLD: 15.9 % (ref 18–48)
MAGNESIUM SERPL-MCNC: 1.9 MG/DL (ref 1.6–2.6)
MCH RBC QN AUTO: 29.2 PG (ref 27–31)
MCHC RBC AUTO-ENTMCNC: 31.9 G/DL (ref 32–36)
MCV RBC AUTO: 91 FL (ref 82–98)
MONOCYTES # BLD AUTO: 1.8 K/UL (ref 0.3–1)
MONOCYTES NFR BLD: 11.2 % (ref 4–15)
NEUTROPHILS # BLD AUTO: 11.6 K/UL (ref 1.8–7.7)
NEUTROPHILS NFR BLD: 71.6 % (ref 38–73)
NRBC BLD-RTO: 0 /100 WBC
OHS QRS DURATION: 80 MS
OHS QTC CALCULATION: 418 MS
PHOSPHATE SERPL-MCNC: 2.4 MG/DL (ref 2.7–4.5)
PLATELET # BLD AUTO: 175 K/UL (ref 150–450)
PMV BLD AUTO: 9.7 FL (ref 9.2–12.9)
POTASSIUM SERPL-SCNC: 3.8 MMOL/L (ref 3.5–5.1)
PROT SERPL-MCNC: 5.4 G/DL (ref 6–8.4)
RBC # BLD AUTO: 3.43 M/UL (ref 4–5.4)
SODIUM SERPL-SCNC: 138 MMOL/L (ref 136–145)
WBC # BLD AUTO: 16.21 K/UL (ref 3.9–12.7)

## 2024-10-07 PROCEDURE — 99223 1ST HOSP IP/OBS HIGH 75: CPT | Mod: ,,, | Performed by: STUDENT IN AN ORGANIZED HEALTH CARE EDUCATION/TRAINING PROGRAM

## 2024-10-07 PROCEDURE — 63600175 PHARM REV CODE 636 W HCPCS: Performed by: HOSPITALIST

## 2024-10-07 PROCEDURE — 25000003 PHARM REV CODE 250: Performed by: HOSPITALIST

## 2024-10-07 PROCEDURE — 94761 N-INVAS EAR/PLS OXIMETRY MLT: CPT

## 2024-10-07 PROCEDURE — 25000003 PHARM REV CODE 250

## 2024-10-07 PROCEDURE — 84100 ASSAY OF PHOSPHORUS: CPT

## 2024-10-07 PROCEDURE — 85025 COMPLETE CBC W/AUTO DIFF WBC: CPT

## 2024-10-07 PROCEDURE — 99900035 HC TECH TIME PER 15 MIN (STAT)

## 2024-10-07 PROCEDURE — 80053 COMPREHEN METABOLIC PANEL: CPT

## 2024-10-07 PROCEDURE — 83735 ASSAY OF MAGNESIUM: CPT

## 2024-10-07 PROCEDURE — 63600175 PHARM REV CODE 636 W HCPCS

## 2024-10-07 PROCEDURE — 11000001 HC ACUTE MED/SURG PRIVATE ROOM

## 2024-10-07 PROCEDURE — 63600175 PHARM REV CODE 636 W HCPCS: Performed by: STUDENT IN AN ORGANIZED HEALTH CARE EDUCATION/TRAINING PROGRAM

## 2024-10-07 PROCEDURE — 36415 COLL VENOUS BLD VENIPUNCTURE: CPT

## 2024-10-07 RX ORDER — HYDROMORPHONE HYDROCHLORIDE 1 MG/ML
0.5 INJECTION, SOLUTION INTRAMUSCULAR; INTRAVENOUS; SUBCUTANEOUS EVERY 4 HOURS PRN
Status: DISCONTINUED | OUTPATIENT
Start: 2024-10-07 | End: 2024-10-08 | Stop reason: HOSPADM

## 2024-10-07 RX ADMIN — HYDROMORPHONE HYDROCHLORIDE 0.5 MG: 1 INJECTION, SOLUTION INTRAMUSCULAR; INTRAVENOUS; SUBCUTANEOUS at 06:10

## 2024-10-07 RX ADMIN — POLYETHYLENE GLYCOL 3350 17 G: 17 POWDER, FOR SOLUTION ORAL at 08:10

## 2024-10-07 RX ADMIN — BUSPIRONE HYDROCHLORIDE 15 MG: 10 TABLET ORAL at 08:10

## 2024-10-07 RX ADMIN — MEROPENEM 1 G: 1 INJECTION INTRAVENOUS at 04:10

## 2024-10-07 RX ADMIN — VANCOMYCIN HYDROCHLORIDE 750 MG: 750 INJECTION, POWDER, LYOPHILIZED, FOR SOLUTION INTRAVENOUS at 05:10

## 2024-10-07 RX ADMIN — HYDROMORPHONE HYDROCHLORIDE 1 MG: 1 INJECTION, SOLUTION INTRAMUSCULAR; INTRAVENOUS; SUBCUTANEOUS at 12:10

## 2024-10-07 RX ADMIN — SODIUM CHLORIDE, POTASSIUM CHLORIDE, SODIUM LACTATE AND CALCIUM CHLORIDE: 600; 310; 30; 20 INJECTION, SOLUTION INTRAVENOUS at 07:10

## 2024-10-07 RX ADMIN — HYDROMORPHONE HYDROCHLORIDE 1 MG: 1 INJECTION, SOLUTION INTRAMUSCULAR; INTRAVENOUS; SUBCUTANEOUS at 07:10

## 2024-10-07 RX ADMIN — MEROPENEM 1 G: 1 INJECTION INTRAVENOUS at 12:10

## 2024-10-07 RX ADMIN — ESCITALOPRAM OXALATE 20 MG: 10 TABLET ORAL at 08:10

## 2024-10-07 RX ADMIN — MEROPENEM 1 G: 1 INJECTION INTRAVENOUS at 08:10

## 2024-10-07 RX ADMIN — HYDROMORPHONE HYDROCHLORIDE 0.5 MG: 1 INJECTION, SOLUTION INTRAMUSCULAR; INTRAVENOUS; SUBCUTANEOUS at 02:10

## 2024-10-07 RX ADMIN — SODIUM CHLORIDE, POTASSIUM CHLORIDE, SODIUM LACTATE AND CALCIUM CHLORIDE: 600; 310; 30; 20 INJECTION, SOLUTION INTRAVENOUS at 08:10

## 2024-10-07 RX ADMIN — HYDROMORPHONE HYDROCHLORIDE 0.5 MG: 1 INJECTION, SOLUTION INTRAMUSCULAR; INTRAVENOUS; SUBCUTANEOUS at 10:10

## 2024-10-07 NOTE — ASSESSMENT & PLAN NOTE
Personally reviewed CT scan  Urine cx pre martinez negative  Blood cultures prelim negative  Treatment with antibiotics planned for 2 weeks, transition to PO abx when cultures finalize  During 10/2023 presentation her urine culture demonstrated Ecoli  Avoid constipation

## 2024-10-07 NOTE — PROGRESS NOTES
"[facility] Medicine  Progress Note    Patient Name: Ruby Lebron  MRN: [id number]  Patient Class: IP- Inpatient   Admission Date: 10/6/2024  Length of Stay: 1 days  Attending Physician: Rob Andrew MD  Primary Care Provider: Tamara Worrell MD (Inactive)        Subjective:     Principal Problem:Pyelonephritis        HPI:  Ruby Lebron is a 39 y.o. female with Hx of Pyelonephritis in 2023 c/b septic shock, and  Opioid use disorder in remission on Suboxone  who presented to [facility] ED on 10/06/2024 for eval and treatment of bilateral flank pain R > L.  They describe their pain as sharp and similar to 2023, 10/10, located "all along my sides, especially deep inside" with radiation towards her groin.  It started a few days prior to presentation and has been worsening since; now constant.  There is associated fever, chills, nausea, vomitting.  They deny associated headache, dyspnea.  Symptoms are alleviated by nothing.  Symptoms are worsened by movement.  She reports last taking her suboxone on 10/4 due to inability to tolerate anything PO and denies use of other opioids in the interim.    ED course notable for the following: Appeared very uncomfortable.  VS with tachycardia and 99.6 fever.  Exam w R CVA tenderness.  Labs WBC 26, UA dirty.  CT Renal stone: Kidneys/ Ureters: 6 mm nonobstructing renal stone on the left.  No stones on the right.  Diminished image resolution and diminished visualization with suboptimal image quality.  Possible mild hydronephrosis on the right.  Possible mild edema in the right kidney.  No hydroureter.  ED therapy/stabilization measures: Started on CTX and pain control w morphine.  On arrival to [facility] she appeared ill and in significant pain.  Spiked a 103.1 fever soon after.  They were admitted to [facility] for further evaluation and treatment.     Overview/Hospital Course:  No notes on file    Interval History: " no acute issues, she feels her right side seems a bit better pain wise.     Review of Systems  Objective:     Vital Signs (Most Recent):  Temp: 98.3 °F (36.8 °C) (10/07/24 0747)  Pulse: 91 (10/07/24 0747)  Resp: 18 (10/07/24 0747)  BP: 120/71 (10/07/24 0747)  SpO2: 97 % (10/07/24 0747) Vital Signs (24h Range):  Temp:  [98.3 °F (36.8 °C)-103.1 °F (39.5 °C)] 98.3 °F (36.8 °C)  Pulse:  [] 91  Resp:  [17-22] 18  SpO2:  [96 %-99 %] 97 %  BP: ()/(63-83) 120/71     Weight: 39.9 kg (88 lb)  Body mass index is 17.77 kg/m².    Intake/Output Summary (Last 24 hours) at 10/7/2024 1056  Last data filed at 10/6/2024 2321  Gross per 24 hour   Intake --   Output 700 ml   Net -700 ml         Physical Exam  Vitals and nursing note reviewed.   Constitutional:       General: She is not in acute distress (in severe pain).     Appearance: She is underweight. She is ill-appearing.   Cardiovascular:      Rate and Rhythm: Regular rhythm. Tachycardia present.      Pulses: Normal pulses.      Heart sounds: Normal heart sounds.   Pulmonary:      Effort: Pulmonary effort is normal.      Breath sounds: Normal breath sounds.   Abdominal:      General: Bowel sounds are normal. There is no distension.      Tenderness: There is abdominal tenderness. There is right CVA tenderness and guarding. There is no left CVA tenderness or rebound.   Skin:     General: Skin is warm and dry.      Capillary Refill: Capillary refill takes less than 2 seconds.   Neurological:      General: No focal deficit present.   Psychiatric:         Mood and Affect: Mood normal.         Behavior: Behavior normal.             Significant Labs: All pertinent labs within the past 24 hours have been reviewed.    Significant Imaging: I have reviewed all pertinent imaging results/findings within the past 24 hours.    Assessment/Plan:      * Pyelonephritis  Nephrolithiasis  Hydronephrosis  Sepsis  Opioid use disorder in sustained remission    39F w Hx of Pyelonephritis in  "2023 that required ICU stay at [facility] and of Endometriosis s/p laparoscopic hysterectomy in 2018 p/w atraumatic nonradiating right flank pain with nausea and vomiting x 2 days. She does also report subjective fever/chills. She reports urine has been darker than normal and foul-smelling but denies any specific urinary symptoms.  Said she felt "like I'm getting septic like I was before."    CT Renal stone: Kidneys/ Ureters: 6 mm nonobstructing renal stone on the left.  No stones on the right.  Diminished image resolution and diminished visualization with suboptimal image quality.  The lack of thin-section imaging significantly diminishes the sensitivity.  5 mm images are submitted.  Possible mild hydronephrosis on the right.  Possible mild edema in the right kidney could be associated with mild inflammatory or infectious process.    Shortly after arrival to [facility] spiked a 103.1 fever.  Tachycardic.  Appeared ill, not toxic    Case d/w on-call Urology attending; assistance is greatly appreciated.  ABX per recs and consult placed for the AM and will contact IR STAT for R nephrostomy tube if she decompensates in the interim.  Vanc + meropenem given her spiking a 103.1 fever and appearing almost toxic on arrival to [facility]  UCX pending, will de-escalate ABX as soon as possible      Body mass index (BMI) less than 19  - stable        Sepsis without acute organ dysfunction  See pyelonephritis      Hydronephrosis  - noted mild right sided hydronephrosis with no evidence of obstruction  - Urology consulted      Nephrolithiasis  - non obstructing stone on left      Opioid use disorder, severe, in sustained remission  -  queried and inconsistent buprenorphine fills, last fill 8/9/24        VTE Risk Mitigation (From admission, onward)           Ordered     IP VTE LOW RISK PATIENT  Once         10/06/24 1628     Place sequential compression device  Until discontinued         10/06/24 1628                    Discharge Planning "   YARY:      Code Status: Full Code   Is the patient medically ready for discharge?:     Reason for patient still in hospital (select all that apply): Treatment                     Rob Andrew MD  Department of Hospital Medicine   [facility]

## 2024-10-07 NOTE — ASSESSMENT & PLAN NOTE
Electrolytes thankfully OK.  High protein high calorie diet ordered.  Boost Plus with all meals.  Will consider appetite stimulant.

## 2024-10-07 NOTE — PROGRESS NOTES
Vancomycin consult follow-up:    Patient reviewed, renal function stable, no new levels, continue current therapy; Next levels due: trough due 10/8/2024 at 1700

## 2024-10-07 NOTE — SUBJECTIVE & OBJECTIVE
Interval History: no acute issues, she feels her right side seems a bit better pain wise.     Review of Systems  Objective:     Vital Signs (Most Recent):  Temp: 98.3 °F (36.8 °C) (10/07/24 0747)  Pulse: 91 (10/07/24 0747)  Resp: 18 (10/07/24 0747)  BP: 120/71 (10/07/24 0747)  SpO2: 97 % (10/07/24 0747) Vital Signs (24h Range):  Temp:  [98.3 °F (36.8 °C)-103.1 °F (39.5 °C)] 98.3 °F (36.8 °C)  Pulse:  [] 91  Resp:  [17-22] 18  SpO2:  [96 %-99 %] 97 %  BP: ()/(63-83) 120/71     Weight: 39.9 kg (88 lb)  Body mass index is 17.77 kg/m².    Intake/Output Summary (Last 24 hours) at 10/7/2024 1056  Last data filed at 10/6/2024 2321  Gross per 24 hour   Intake --   Output 700 ml   Net -700 ml         Physical Exam  Vitals and nursing note reviewed.   Constitutional:       General: She is not in acute distress (in severe pain).     Appearance: She is underweight. She is ill-appearing.   Cardiovascular:      Rate and Rhythm: Regular rhythm. Tachycardia present.      Pulses: Normal pulses.      Heart sounds: Normal heart sounds.   Pulmonary:      Effort: Pulmonary effort is normal.      Breath sounds: Normal breath sounds.   Abdominal:      General: Bowel sounds are normal. There is no distension.      Tenderness: There is abdominal tenderness. There is right CVA tenderness and guarding. There is no left CVA tenderness or rebound.   Skin:     General: Skin is warm and dry.      Capillary Refill: Capillary refill takes less than 2 seconds.   Neurological:      General: No focal deficit present.   Psychiatric:         Mood and Affect: Mood normal.         Behavior: Behavior normal.             Significant Labs: All pertinent labs within the past 24 hours have been reviewed.    Significant Imaging: I have reviewed all pertinent imaging results/findings within the past 24 hours.

## 2024-10-07 NOTE — NURSING
[facility]  Nurses Note -- 4 Eyes      10/6/2024       Skin assessed on: Q Shift      [x] No Pressure Injuries Present    [x]Prevention Measures Documented    [] Yes LDA  for Pressure Injury Previously documented     [] Yes New Pressure Injury Discovered   [] LDA for New Pressure Injury Added      Attending RN:  Saloni Malagon RN     Second RN:  NELDA Montana

## 2024-10-07 NOTE — HPI
"Ruby Lebron is a 39 y.o. female with Hx of Pyelonephritis in 2023 c/b septic shock, and  Opioid use disorder in remission on Suboxone  who presented to [facility] ED on 10/06/2024 for eval and treatment of bilateral flank pain R > L.  They describe their pain as sharp and similar to 2023, 10/10, located "all along my sides, especially deep inside" with radiation towards her groin.  It started a few days prior to presentation and has been worsening since; now constant.  There is associated fever, chills, nausea, vomitting.  They deny associated headache, dyspnea.  Symptoms are alleviated by nothing.  Symptoms are worsened by movement.  She reports last taking her suboxone on 10/4 due to inability to tolerate anything PO and denies use of other opioids in the interim.    ED course notable for the following: Appeared very uncomfortable.  VS with tachycardia and 99.6 fever.  Exam w R CVA tenderness.  Labs WBC 26, UA dirty.  CT Renal stone: Kidneys/ Ureters: 6 mm nonobstructing renal stone on the left.  No stones on the right.  Diminished image resolution and diminished visualization with suboptimal image quality.  Possible mild hydronephrosis on the right.  Possible mild edema in the right kidney.  No hydroureter.  ED therapy/stabilization measures: Started on CTX and pain control w morphine.  On arrival to [facility] she appeared ill and in significant pain.  Spiked a 103.1 fever soon after.  They were admitted to [facility] for further evaluation and treatment.   "

## 2024-10-07 NOTE — PLAN OF CARE
Case Management Assessment     PCP: Tamara Worrell MD (Inactive)     Pharmacy: [facility]      Patient Arrived From: Pt's home    Existing Help at Home: Pt's family. 2 daughters and significant other.    Barriers to Discharge: None at this time    Discharge Plan:    A. Home with family   B. Home with family    CM met with pt at bedside. Pt was tearful and stated she felt like she did not have support. CM practiced active listening skills and strengths perspective approach to support her. Pt stated her daughterShira [phone] will provide transportation upon discharge.     10/07/24 1407   Discharge Assessment   Assessment Type Discharge Planning Assessment   Confirmed/corrected address, phone number and insurance Yes   Confirmed Demographics Correct on Facesheet   Source of Information patient   When was your last doctors appointment? 10/09/23   Communicated YARY with patient/caregiver No   Reason For Admission Pyelonephritis   People in Home significant other;child(astrid), adult   Facility Arrived From: Pt's home   Do you expect to return to your current living situation? Yes   Do you have help at home or someone to help you manage your care at home? No   Prior to hospitilization cognitive status: Alert/Oriented   Current cognitive status: Alert/Oriented  (tearful)   Walking or Climbing Stairs Difficulty no   Dressing/Bathing Difficulty no   Equipment Currently Used at Home none   Readmission within 30 days? No   Patient currently being followed by outpatient case management? No   Do you currently have service(s) that help you manage your care at home? No   Do you take prescription medications? Yes   Do you have prescription coverage? Yes   Coverage [facility] [employer]   Do you have any problems affording any of your prescribed medications? No   Is the patient taking medications as prescribed? yes   Who is going to help you get home at discharge?  pt's family. Shira Sousa (daughter) [phone]   How do you get to doctors appointments? family or friend will provide   Are you on dialysis? No   Do you take coumadin? No   Discharge Plan A Home with family   Discharge Plan B Home with family   DME Needed Upon Discharge  other (see comments)  (TBD)   Discharge Plan discussed with: Patient   Transition of Care Barriers Transportation   Physical Activity   On average, how many days per week do you engage in moderate to strenuous exercise (like a brisk walk)? 3 days   On average, how many minutes do you engage in exercise at this level? 30 min   Financial Resource Strain   How hard is it for you to pay for the very basics like food, housing, medical care, and heating? Somewhat   Housing Stability   In the last 12 months, was there a time when you were not able to pay the mortgage or rent on time? N   At any time in the past 12 months, were you homeless or living in a shelter (including now)? N   Transportation Needs   Has the lack of transportation kept you from medical appointments, meetings, work or from getting things needed for daily living? Yes, it has kept me from medical appointments or from getting my medications.   Food Insecurity   Within the past 12 months, you worried that your food would run out before you got the money to buy more. Never true   Within the past 12 months, the food you bought just didn't last and you didn't have money to get more. Never true   Stress   Do you feel stress - tense, restless, nervous, or anxious, or unable to sleep at night because your mind is troubled all the time - these days? To some exte   Alcohol Use   Q1: How often do you have a drink containing alcohol? Pt Declined   Q2: How many drinks containing alcohol do you have on a typical day when you are drinking? Pt Declined   Q3: How often do you have six or more drinks on one occasion? Pt Declined   Utilities   In the past 12 months has the electric, gas, oil, or water  company threatened to shut off services in your home? No   Health Literacy   How often do you need to have someone help you when you read instructions, pamphlets, or other written material from your doctor or pharmacy? Never   OTHER   Name(s) of People in Home pt lives with her significant other and two daughters. Pt only disclosed contact information for Shira Sousa, daughter [phone]

## 2024-10-07 NOTE — SUBJECTIVE & OBJECTIVE
Past Medical History:   Diagnosis Date    Abnormal Pap smear 04/06/10    mild dysplasia    Abnormal Pap smear of cervix     Cervical cancer     Endometriosis     Headache     Hypertension     Seizure 2020       Past Surgical History:   Procedure Laterality Date    CERVICAL BIOPSY  W/ LOOP ELECTRODE EXCISION  2010    Mild dysplasia     SECTION, LOW TRANSVERSE       SECTION, LOW TRANSVERSE      HYSTERECTOMY      LAPAROSCOPIC LYSIS OF ADHESIONS  8/15/2018    Procedure: LYSIS, ADHESIONS, LAPAROSCOPIC;  Surgeon: Donny Torres MD;  Location: [facility] OR;  Service: OB/GYN;;    LAPAROSCOPIC SALPINGECTOMY Bilateral 8/15/2018    Procedure: SALPINGECTOMY, LAPAROSCOPIC;  Surgeon: Donny Torres MD;  Location: [facility] OR;  Service: OB/GYN;  Laterality: Bilateral;    LAPAROSCOPIC TOTAL HYSTERECTOMY N/A 8/15/2018    Procedure: HYSTERECTOMY, TOTAL, LAPAROSCOPIC;  Surgeon: Donny Torres MD;  Location: [facility] OR;  Service: OB/GYN;  Laterality: N/A;  TF DR NGUYEN PER DR TORRES  RN PRE OP 8-10-18    PELVIC LAPAROSCOPY         Review of patient's allergies indicates:   Allergen Reactions    Clindamycin Rash       No current facility-administered medications on file prior to encounter.     Current Outpatient Medications on File Prior to Encounter   Medication Sig    albuterol (PROVENTIL/VENTOLIN HFA) 90 mcg/actuation inhaler albuterol sulfate Take 2 puff(s) (inhalation) every 4 hours PRN - Wheezing for 30 days 2020 HFA aerosol inhaler every 4 hours inhalation 30 days suspended 90 mcg/actuation    busPIRone (BUSPAR) 15 MG tablet Take 15 mg by mouth 2 (two) times daily.    cetirizine (ZYRTEC) 10 MG tablet Take 1 tablet (10 mg total) by mouth daily as needed for Allergies. (Patient not taking: Reported on 2021)    CHANTIX 1 mg Tab Take 1 mg by mouth 2 (two) times daily.    cholecalciferol, vitamin D3, 1,250 mcg (50,000 unit) capsule Take 50,000 Units by mouth every 7 days.    cyanocobalamin 1,000 mcg/mL injection One  cc ( 1mg/cc) IM q week times 4, and then q month thereafter    ergocalciferol (ERGOCALCIFEROL) 50,000 unit Cap     escitalopram oxalate (LEXAPRO) 20 MG tablet Take 20 mg by mouth once daily.    gabapentin (NEURONTIN) 300 MG capsule Take 1 capsule (300 mg total) by mouth every 8 (eight) hours.    hydrocortisone 2.5 % cream Apply topically 2 (two) times daily.    ibuprofen (ADVIL,MOTRIN) 600 MG tablet Take 1 tablet (600 mg total) by mouth 3 (three) times daily.    metoprolol succinate (TOPROL-XL) 25 MG 24 hr tablet Take 25 mg by mouth once daily.    prochlorperazine (COMPAZINE) 10 MG tablet Take 1 tablet (10 mg total) by mouth 3 (three) times daily as needed (nausea).    rizatriptan (MAXALT) 10 MG tablet One po q 2 hour prn migraine. No more than 3 in 24 hours    SUBOXONE 8-2 mg Film PLACE ONE film UNDER THE TONGUE TWICE DAILY     Family History       Problem Relation (Age of Onset)    Hypertension Father, Mother          Tobacco Use    Smoking status: Every Day     Current packs/day: 0.50     Average packs/day: 0.5 packs/day for 13.0 years (6.5 ttl pk-yrs)     Types: Cigarettes    Smokeless tobacco: Never   Substance and Sexual Activity    Alcohol use: No    Drug use: No    Sexual activity: Not Currently     Partners: Male     Birth control/protection: OCP     Review of Systems   Reason unable to perform ROS: ROS was performed and pertinent +s and -s are listed in HPI.     Objective:     Vital Signs (Most Recent):  Temp: 99.8 °F (37.7 °C) (10/06/24 2045)  Pulse: 106 (10/06/24 1938)  Resp: 19 (10/06/24 2108)  BP: 130/80 (10/06/24 1938)  SpO2: 96 % (10/06/24 1938) Vital Signs (24h Range):  Temp:  [98.4 °F (36.9 °C)-103.1 °F (39.5 °C)] 99.8 °F (37.7 °C)  Pulse:  [] 106  Resp:  [17-22] 19  SpO2:  [96 %-99 %] 96 %  BP: (119-138)/(74-83) 130/80     Weight: 39.9 kg (88 lb)  Body mass index is 17.77 kg/m².     Physical Exam  Constitutional:       General: She is in acute distress (in severe pain).      Appearance: She  "is underweight. She is ill-appearing. She is not toxic-appearing.   Cardiovascular:      Rate and Rhythm: Regular rhythm. Tachycardia present.      Pulses: Normal pulses.      Heart sounds: Normal heart sounds.   Pulmonary:      Effort: Pulmonary effort is normal.      Breath sounds: Normal breath sounds.   Abdominal:      General: Bowel sounds are normal. There is no distension.      Tenderness: There is abdominal tenderness. There is right CVA tenderness, left CVA tenderness and guarding. There is no rebound.   Skin:     General: Skin is warm and dry.      Capillary Refill: Capillary refill takes less than 2 seconds.   Neurological:      General: No focal deficit present.   Psychiatric:         Mood and Affect: Mood normal.         Behavior: Behavior normal.                Significant Labs: All pertinent labs within the past 24 hours have been reviewed.  Blood Culture: No results for input(s): "LABBLOO" in the last 48 hours.  CBC:   Recent Labs   Lab 10/06/24  1851   WBC 24.07*   HGB 12.6   HCT 37.5        CMP:   Recent Labs   Lab 10/06/24  1851      K 4.3      CO2 21*      BUN 13   CREATININE 0.7   CALCIUM 9.2   PROT 6.9   ALBUMIN 3.4*   BILITOT 0.4   ALKPHOS 78   AST 39   ALT 23   ANIONGAP 11     Urine Culture: No results for input(s): "LABURIN" in the last 48 hours.  Urine Studies:   Recent Labs   Lab 10/06/24  1443   COLORU Yellow   SPECGRAV >=1.030*   NITRITE Negative   UROBILINOGEN 0.2   LEUKOCYTESUR Negative       Significant Imaging: I have reviewed all pertinent imaging results/findings within the past 24 hours.  "

## 2024-10-07 NOTE — NURSING
[facility] MEWS TRIGGER FOLLOW UP       MEWS Monitoring, Score is: 4  Indication for review: Temp 103.1    Bedside Nurse, Saloni contacted, patient seen at bedside. Patient is feeling better. Repeat temperature 99.8. Please call [phone] with any concerns.

## 2024-10-07 NOTE — NURSING
Patient latest temp of 103.1, Acetaminophen 650mg PO given @ 1833, Dr. Mcpherson informed, he ordered additional abx Morepenem 1gm IV.

## 2024-10-07 NOTE — CONSULTS
[facility] S[facility]  Urology  Consult Note    Patient Name: Ruby Lebron  MRN: [id number]  Admission Date: 10/6/2024  Hospital Length of Stay: 1   Code Status: Full Code   Attending Provider: Rob Andrew MD   Consulting Provider: Cyndie Graham MD  Primary Care Physician: Tamara Worrell MD (Inactive)  Principal Problem:Pyelonephritis    Inpatient consult to Urology  Consult performed by: Cyndie Graham MD  Consult ordered by: Terrance Mcpherson MD  Reason for consult: pyelonephritis/ stone  Assessment/Recommendations: See below          Subjective:     HPI:  38 yo woman w/ R flank pain found to have R pyelonephritis. Noted to have similar presentation 10/2023. Denies nausea, vomiting. She denies L flank pain. She has known hx of L renal stone from 10/2023. She denies constipation. Feels better since admission, still has R flank pain.     Past Medical History:   Diagnosis Date    Abnormal Pap smear 04/06/10    mild dysplasia    Abnormal Pap smear of cervix     Cervical cancer     Endometriosis     Headache     Hypertension     Seizure 2020       Past Surgical History:   Procedure Laterality Date    CERVICAL BIOPSY  W/ LOOP ELECTRODE EXCISION  2010    Mild dysplasia     SECTION, LOW TRANSVERSE       SECTION, LOW TRANSVERSE      HYSTERECTOMY      LAPAROSCOPIC LYSIS OF ADHESIONS  8/15/2018    Procedure: LYSIS, ADHESIONS, LAPAROSCOPIC;  Surgeon: Donny Damon MD;  Location: [facility] OR;  Service: OB/GYN;;    LAPAROSCOPIC SALPINGECTOMY Bilateral 8/15/2018    Procedure: SALPINGECTOMY, LAPAROSCOPIC;  Surgeon: Donny Damon MD;  Location: [facility] OR;  Service: OB/GYN;  Laterality: Bilateral;    LAPAROSCOPIC TOTAL HYSTERECTOMY N/A 8/15/2018    Procedure: HYSTERECTOMY, TOTAL, LAPAROSCOPIC;  Surgeon: Donny Damon MD;  Location: [facility] OR;  Service: OB/GYN;  Laterality: N/A;  TF DR NGUYEN PER DR DAMON  RN PRE OP 8-10-18    PELVIC LAPAROSCOPY         Review of patient's allergies  indicates:   Allergen Reactions    Clindamycin Rash       Family History       Problem Relation (Age of Onset)    Hypertension Father, Mother            Tobacco Use    Smoking status: Every Day     Current packs/day: 0.50     Average packs/day: 0.5 packs/day for 13.0 years (6.5 ttl pk-yrs)     Types: Cigarettes    Smokeless tobacco: Never   Substance and Sexual Activity    Alcohol use: No    Drug use: No    Sexual activity: Not Currently     Partners: Male     Birth control/protection: OCP       Review of Systems   Constitutional:  Positive for fever. Negative for activity change, appetite change, chills and diaphoresis.   HENT:  Negative for hearing loss.    Eyes:  Negative for visual disturbance.   Respiratory:  Negative for cough, shortness of breath and wheezing.    Gastrointestinal:  Negative for abdominal distention, abdominal pain, constipation, nausea and vomiting.   Genitourinary:  Positive for flank pain. Negative for difficulty urinating, dysuria and hematuria.   Musculoskeletal:  Negative for neck pain and neck stiffness.   Neurological:  Negative for dizziness and weakness.       Objective:     Temp:  [98.2 °F (36.8 °C)-103.1 °F (39.5 °C)] 98.2 °F (36.8 °C)  Pulse:  [] 81  Resp:  [17-22] 18  SpO2:  [96 %-99 %] 97 %  BP: ()/(57-83) 99/57  Weight: 39.9 kg (88 lb)  Body mass index is 17.77 kg/m².    Date 10/07/24 0700 - 10/08/24 0659   Shift 7990-7126 9007-6805 5345-3802 24 Hour Total   INTAKE   P.O. 180   180   Shift Total(mL/kg) 180(4.5)   180(4.5)   OUTPUT   Shift Total(mL/kg)       Weight (kg) 39.9 39.9 39.9 39.9          Drains       None                    Physical Exam  Constitutional:       Appearance: She is well-developed.   HENT:      Head: Normocephalic and atraumatic.   Eyes:      Conjunctiva/sclera: Conjunctivae normal.   Pulmonary:      Effort: Pulmonary effort is normal. No respiratory distress.   Abdominal:      General: Abdomen is flat. There is no distension.      Palpations:  Abdomen is soft. There is no mass.      Tenderness: There is no abdominal tenderness. There is right CVA tenderness. There is no left CVA tenderness or guarding.   Skin:     General: Skin is warm.      Findings: No rash.   Neurological:      Mental Status: She is alert and oriented to person, place, and time.   Psychiatric:         Behavior: Behavior normal.          Significant Labs:    BMP:  Recent Labs   Lab 10/06/24  1851 10/07/24  0628    138   K 4.3 3.8    104   CO2 21* 25   BUN 13 10   CREATININE 0.7 0.6   CALCIUM 9.2 8.4*       CBC:  Recent Labs   Lab 10/06/24  1851 10/07/24  0628   WBC 24.07* 16.21*   HGB 12.6 10.0*   HCT 37.5 31.3*    175       Recent Lab Results  (Last 5 results in the past 24 hours)        10/07/24  0628   10/06/24  1851   10/06/24  1556   10/06/24  1556   10/06/24  1553        Albumin 2.7   3.4             ALP 77   78             Allens Test       N/A         ALT 25   23             Anion Gap 9   11             AST 26   39             Baso # 0.04   0.06             Basophil % 0.2   0.2             BILIRUBIN TOTAL 0.3  Comment: For infants and newborns, interpretation of results should be based  on gestational age, weight and in agreement with clinical  observations.    Premature Infant recommended reference ranges:  Up to 24 hours.............<8.0 mg/dL  Up to 48 hours............<12.0 mg/dL  3-5 days..................<15.0 mg/dL  6-29 days.................<15.0 mg/dL     0.4  Comment: For infants and newborns, interpretation of results should be based  on gestational age, weight and in agreement with clinical  observations.    Premature Infant recommended reference ranges:  Up to 24 hours.............<8.0 mg/dL  Up to 48 hours............<12.0 mg/dL  3-5 days..................<15.0 mg/dL  6-29 days.................<15.0 mg/dL               Site       Other         BUN 10   13             Calcium 8.4   9.2             Chloride 104   106             CO2 25   21              Creatinine 0.6   0.7             Differential Method Automated   Automated             eGFR >60   >60             Eos # 0.1   0.0             Eos % 0.4   0.1             Glucose 107   100             Gran # (ANC) 11.6   19.8             Gran % 71.6   82.3             Hematocrit 31.3   37.5             Hemoglobin 10.0   12.6             Immature Grans (Abs) 0.11  Comment: Mild elevation in immature granulocytes is non specific and   can be seen in a variety of conditions including stress response,   acute inflammation, trauma and pregnancy. Correlation with other   laboratory and clinical findings is essential.     0.16  Comment: Mild elevation in immature granulocytes is non specific and   can be seen in a variety of conditions including stress response,   acute inflammation, trauma and pregnancy. Correlation with other   laboratory and clinical findings is essential.               Immature Granulocytes 0.7   0.7             Lymph # 2.6   2.4             Lymph % 15.9   10.0             Magnesium  1.9   1.9             MCH 29.2   30.8             MCHC 31.9   33.6             MCV 91   92             Mono # 1.8   1.6             Mono % 11.2   6.7             MPV 9.7   9.3             nRBC 0   0             Phosphorus Level 2.4               Platelet Count 175   224             POC BE       -2         POC HCO3       22.5         POC Lactate       0.72         POC PCO2       38.0         POC PH       7.381         POC PO2       42         POC PTINR         1.2       POC PTWBT         11.9       POC SATURATED O2       77         POC TCO2       24         ISTAT Cardiac Troponin I     0.02           Potassium 3.8   4.3             PROTEIN TOTAL 5.4   6.9             RBC 3.43   4.09             RDW 12.9   12.9             Sample     unknown  Comment: A single negative troponin is insufficient to rule out myocardial infarction.  The use of a serial sampling protocol is recommended practice. Correlate results with  reference intervals established for methodology used. Point of care and core laboratory   troponin results are not interchangeable.     VENOUS   unknown       Sodium 138   138             WBC 16.21   24.07                                    Significant Imaging:  CT: I have reviewed all results within the past 24 hours and my personal findings are:  L non obstructing stone, R kidney w/ R hydronephrosis, no evidence of obstructing urolith                    Assessment and Plan:     * Pyelonephritis  Personally reviewed CT scan  Urine cx pre martinez negative  Blood cultures prelim negative  Treatment with antibiotics planned for 2 weeks, transition to PO abx when cultures finalize  During 10/2023 presentation her urine culture demonstrated Ecoli  Avoid constipation    Hydronephrosis  Non obstructing  2/2 to pyelonephritis    Nephrolithiasis  Left renal stone, asymptomatic, presence since 10/2023  Outpatient follow up         VTE Risk Mitigation (From admission, onward)           Ordered     IP VTE LOW RISK PATIENT  Once         10/06/24 1628     Place sequential compression device  Until discontinued         10/06/24 1628                    Thank you for your consult.    Cyndie Graham MD  Urology  [facility] Surg

## 2024-10-07 NOTE — SUBJECTIVE & OBJECTIVE
Past Medical History:   Diagnosis Date    Abnormal Pap smear 04/06/10    mild dysplasia    Abnormal Pap smear of cervix     Cervical cancer     Endometriosis     Headache     Hypertension     Seizure 2020       Past Surgical History:   Procedure Laterality Date    CERVICAL BIOPSY  W/ LOOP ELECTRODE EXCISION  2010    Mild dysplasia     SECTION, LOW TRANSVERSE       SECTION, LOW TRANSVERSE      HYSTERECTOMY      LAPAROSCOPIC LYSIS OF ADHESIONS  8/15/2018    Procedure: LYSIS, ADHESIONS, LAPAROSCOPIC;  Surgeon: Donny Torres MD;  Location: [facility] OR;  Service: OB/GYN;;    LAPAROSCOPIC SALPINGECTOMY Bilateral 8/15/2018    Procedure: SALPINGECTOMY, LAPAROSCOPIC;  Surgeon: Donyn Torres MD;  Location: [facility] OR;  Service: OB/GYN;  Laterality: Bilateral;    LAPAROSCOPIC TOTAL HYSTERECTOMY N/A 8/15/2018    Procedure: HYSTERECTOMY, TOTAL, LAPAROSCOPIC;  Surgeon: Donny Torres MD;  Location: [facility] OR;  Service: OB/GYN;  Laterality: N/A;  TF DR NGUYEN PER DR TORRES  RN PRE OP 8-10-18    PELVIC LAPAROSCOPY         Review of patient's allergies indicates:   Allergen Reactions    Clindamycin Rash       Family History       Problem Relation (Age of Onset)    Hypertension Father, Mother            Tobacco Use    Smoking status: Every Day     Current packs/day: 0.50     Average packs/day: 0.5 packs/day for 13.0 years (6.5 ttl pk-yrs)     Types: Cigarettes    Smokeless tobacco: Never   Substance and Sexual Activity    Alcohol use: No    Drug use: No    Sexual activity: Not Currently     Partners: Male     Birth control/protection: OCP       Review of Systems   Constitutional:  Positive for fever. Negative for activity change, appetite change, chills and diaphoresis.   HENT:  Negative for hearing loss.    Eyes:  Negative for visual disturbance.   Respiratory:  Negative for cough, shortness of breath and wheezing.    Gastrointestinal:  Negative for abdominal distention, abdominal pain, constipation, nausea and  vomiting.   Genitourinary:  Positive for flank pain. Negative for difficulty urinating, dysuria and hematuria.   Musculoskeletal:  Negative for neck pain and neck stiffness.   Neurological:  Negative for dizziness and weakness.       Objective:     Temp:  [98.2 °F (36.8 °C)-103.1 °F (39.5 °C)] 98.2 °F (36.8 °C)  Pulse:  [] 81  Resp:  [17-22] 18  SpO2:  [96 %-99 %] 97 %  BP: ()/(57-83) 99/57  Weight: 39.9 kg (88 lb)  Body mass index is 17.77 kg/m².    Date 10/07/24 0700 - 10/08/24 0659   Shift 6586-3103 5954-7151 2560-3126 24 Hour Total   INTAKE   P.O. 180   180   Shift Total(mL/kg) 180(4.5)   180(4.5)   OUTPUT   Shift Total(mL/kg)       Weight (kg) 39.9 39.9 39.9 39.9          Drains       None                    Physical Exam  Constitutional:       Appearance: She is well-developed.   HENT:      Head: Normocephalic and atraumatic.   Eyes:      Conjunctiva/sclera: Conjunctivae normal.   Pulmonary:      Effort: Pulmonary effort is normal. No respiratory distress.   Abdominal:      General: Abdomen is flat. There is no distension.      Palpations: Abdomen is soft. There is no mass.      Tenderness: There is no abdominal tenderness. There is right CVA tenderness. There is no left CVA tenderness or guarding.   Skin:     General: Skin is warm.      Findings: No rash.   Neurological:      Mental Status: She is alert and oriented to person, place, and time.   Psychiatric:         Behavior: Behavior normal.          Significant Labs:    BMP:  Recent Labs   Lab 10/06/24  1851 10/07/24  0628    138   K 4.3 3.8    104   CO2 21* 25   BUN 13 10   CREATININE 0.7 0.6   CALCIUM 9.2 8.4*       CBC:  Recent Labs   Lab 10/06/24  1851 10/07/24  0628   WBC 24.07* 16.21*   HGB 12.6 10.0*   HCT 37.5 31.3*    175       Recent Lab Results  (Last 5 results in the past 24 hours)        10/07/24  0628   10/06/24  1851   10/06/24  1556   10/06/24  1556   10/06/24  1553        Albumin 2.7   3.4             ALP 77    78             Allens Test       N/A         ALT 25   23             Anion Gap 9   11             AST 26   39             Baso # 0.04   0.06             Basophil % 0.2   0.2             BILIRUBIN TOTAL 0.3  Comment: For infants and newborns, interpretation of results should be based  on gestational age, weight and in agreement with clinical  observations.    Premature Infant recommended reference ranges:  Up to 24 hours.............<8.0 mg/dL  Up to 48 hours............<12.0 mg/dL  3-5 days..................<15.0 mg/dL  6-29 days.................<15.0 mg/dL     0.4  Comment: For infants and newborns, interpretation of results should be based  on gestational age, weight and in agreement with clinical  observations.    Premature Infant recommended reference ranges:  Up to 24 hours.............<8.0 mg/dL  Up to 48 hours............<12.0 mg/dL  3-5 days..................<15.0 mg/dL  6-29 days.................<15.0 mg/dL               Site       Other         BUN 10   13             Calcium 8.4   9.2             Chloride 104   106             CO2 25   21             Creatinine 0.6   0.7             Differential Method Automated   Automated             eGFR >60   >60             Eos # 0.1   0.0             Eos % 0.4   0.1             Glucose 107   100             Gran # (ANC) 11.6   19.8             Gran % 71.6   82.3             Hematocrit 31.3   37.5             Hemoglobin 10.0   12.6             Immature Grans (Abs) 0.11  Comment: Mild elevation in immature granulocytes is non specific and   can be seen in a variety of conditions including stress response,   acute inflammation, trauma and pregnancy. Correlation with other   laboratory and clinical findings is essential.     0.16  Comment: Mild elevation in immature granulocytes is non specific and   can be seen in a variety of conditions including stress response,   acute inflammation, trauma and pregnancy. Correlation with other   laboratory and clinical findings is  essential.               Immature Granulocytes 0.7   0.7             Lymph # 2.6   2.4             Lymph % 15.9   10.0             Magnesium  1.9   1.9             MCH 29.2   30.8             MCHC 31.9   33.6             MCV 91   92             Mono # 1.8   1.6             Mono % 11.2   6.7             MPV 9.7   9.3             nRBC 0   0             Phosphorus Level 2.4               Platelet Count 175   224             POC BE       -2         POC HCO3       22.5         POC Lactate       0.72         POC PCO2       38.0         POC PH       7.381         POC PO2       42         POC PTINR         1.2       POC PTWBT         11.9       POC SATURATED O2       77         POC TCO2       24         ISTAT Cardiac Troponin I     0.02           Potassium 3.8   4.3             PROTEIN TOTAL 5.4   6.9             RBC 3.43   4.09             RDW 12.9   12.9             Sample     unknown  Comment: A single negative troponin is insufficient to rule out myocardial infarction.  The use of a serial sampling protocol is recommended practice. Correlate results with reference intervals established for methodology used. Point of care and core laboratory   troponin results are not interchangeable.     VENOUS   unknown       Sodium 138   138             WBC 16.21   24.07                                    Significant Imaging:  CT: I have reviewed all results within the past 24 hours and my personal findings are:  L non obstructing stone, R kidney w/ R hydronephrosis, no evidence of obstructing urolith

## 2024-10-07 NOTE — ASSESSMENT & PLAN NOTE
"Nephrolithiasis  Hydronephrosis  Sepsis  Opioid use disorder in sustained remission    39F w Hx of Pyelonephritis in 2023 that required ICU stay at [facility] and of Endometriosis s/p laparoscopic hysterectomy in 2018 p/w atraumatic nonradiating right flank pain with nausea and vomiting x 2 days. She does also report subjective fever/chills. She reports urine has been darker than normal and foul-smelling but denies any specific urinary symptoms.  Said she felt "like I'm getting septic like I was before."    CT Renal stone: Kidneys/ Ureters: 6 mm nonobstructing renal stone on the left.  No stones on the right.  Diminished image resolution and diminished visualization with suboptimal image quality.  The lack of thin-section imaging significantly diminishes the sensitivity.  5 mm images are submitted.  Possible mild hydronephrosis on the right.  Possible mild edema in the right kidney could be associated with mild inflammatory or infectious process.    Shortly after arrival to [facility] spiked a 103.1 fever.  Tachycardic.  Appeared ill, not toxic    Case d/w on-call Urology attending; assistance is greatly appreciated.  ABX per recs and consult placed for the AM and will contact IR STAT for R nephrostomy tube if she decompensates in the interim.  Vanc + meropenem given her spiking a 103.1 fever and appearing almost toxic on arrival to [facility]  UCX pending, will de-escalate ABX as soon as possible    "

## 2024-10-07 NOTE — PROGRESS NOTES
"Pharmacokinetic Initial Assessment: IV Vancomycin    Assessment/Plan:    Initiate intravenous vancomycin with loading dose of 1000 mg once followed by a maintenance dose of vancomycin 750 mg IV every 24 hours  Desired empiric serum trough concentration is 10 to 20 mcg/mL  Draw vancomycin trough level 60 min prior to third dose on 10/8/24 at approximately 17:00  Pharmacy will continue to follow and monitor vancomycin.      Please contact pharmacy at extension [phone] with any questions regarding this assessment.     Thank you for the consult,   Nyla Mcbride       Patient brief summary:  Ruby Lebron is a 39 y.o. female initiated on antimicrobial therapy with IV Vancomycin for treatment of suspected urinary tract infection    Drug Allergies:   Review of patient's allergies indicates:   Allergen Reactions    Clindamycin Rash       Actual Body Weight:   39.9 kg    Renal Function:   Estimated Creatinine Clearance: 66.6 mL/min (based on SCr of 0.7 mg/dL).,     Dialysis Method (if applicable):  N/A    CBC (last 72 hours):  Recent Labs   Lab Result Units 10/06/24  1851   WBC K/uL 24.07*   Hemoglobin g/dL 12.6   Hematocrit % 37.5   Platelets K/uL 224   Gran % % 82.3*   Lymph % % 10.0*   Mono % % 6.7   Eosinophil % % 0.1   Basophil % % 0.2   Differential Method  Automated       Metabolic Panel (last 72 hours):  Recent Labs   Lab Result Units 10/06/24  1851   Sodium mmol/L 138   Potassium mmol/L 4.3   Chloride mmol/L 106   CO2 mmol/L 21*   Glucose mg/dL 100   BUN mg/dL 13   Creatinine mg/dL 0.7   Albumin g/dL 3.4*   Total Bilirubin mg/dL 0.4   Alkaline Phosphatase U/L 78   AST U/L 39   ALT U/L 23   Magnesium mg/dL 1.9       Drug levels (last 3 results):  No results for input(s): "VANCOMYCINRA", "VANCORANDOM", "VANCOMYCINPE", "VANCOPEAK", "VANCOMYCINTR", "VANCOTROUGH" in the last 72 hours.    Microbiologic Results:  Microbiology Results (last 7 days)       Procedure Component Value Units Date/Time    Blood culture x " two cultures. Draw prior to antibiotics. [[id number]] Collected: 10/06/24 1552    Order Status: Sent Specimen: Blood from Peripheral, Antecubital, Right Updated: 10/06/24 1815    Blood culture x two cultures. Draw prior to antibiotics. [[id number]] Collected: 10/06/24 1552    Order Status: Sent Specimen: Blood from Peripheral, Antecubital, Right Updated: 10/06/24 1815    Urine culture **CANNOT BE ORDERED STAT** [[id number]] Collected: 10/06/24 1506    Order Status: Sent Specimen: Urine Updated: 10/06/24 1812

## 2024-10-07 NOTE — H&P
"[facility] Medicine  History & Physical    Patient Name: Ruby Lebron  MRN: [id number]  Patient Class: IP- Inpatient  Admission Date: 10/6/2024  Attending Physician: Savannah Fitzpatrick, *   Primary Care Provider: Tamara Worrell MD (Inactive)         Patient information was obtained from patient, past medical records, and ER records.     Subjective:     Principal Problem:Pyelonephritis    Chief Complaint:   Chief Complaint   Patient presents with    Fever    Vomiting    Back Pain     Right flank pain, onset Friday.         HPI: Ruby Lebron is a 39 y.o. female with Hx of Pyelonephritis in 2023 c/b septic shock, and  Opioid use disorder in remission on Suboxone  who presented to [facility] ED on 10/06/2024 for eval and treatment of bilateral flank pain R > L.  They describe their pain as sharp and similar to 2023, 10/10, located "all along my sides, especially deep inside" with radiation towards her groin.  It started a few days prior to presentation and has been worsening since; now constant.  There is associated fever, chills, nausea, vomitting.  They deny associated headache, dyspnea.  Symptoms are alleviated by nothing.  Symptoms are worsened by movement.  She reports last taking her suboxone on 10/4 due to inability to tolerate anything PO and denies use of other opioids in the interim.    ED course notable for the following: Appeared very uncomfortable.  VS with tachycardia and 99.6 fever.  Exam w R CVA tenderness.  Labs WBC 26, UA dirty.  CT Renal stone: Kidneys/ Ureters: 6 mm nonobstructing renal stone on the left.  No stones on the right.  Diminished image resolution and diminished visualization with suboptimal image quality.  Possible mild hydronephrosis on the right.  Possible mild edema in the right kidney.  No hydroureter.  ED therapy/stabilization measures: Started on CTX and pain control w morphine.  On arrival to [facility] she appeared ill and in " significant pain.  Spiked a 103.1 fever soon after.  They were admitted to [facility] for further evaluation and treatment.     Past Medical History:   Diagnosis Date    Abnormal Pap smear 04/06/10    mild dysplasia    Abnormal Pap smear of cervix     Cervical cancer     Endometriosis     Headache     Hypertension     Seizure 2020       Past Surgical History:   Procedure Laterality Date    CERVICAL BIOPSY  W/ LOOP ELECTRODE EXCISION  2010    Mild dysplasia     SECTION, LOW TRANSVERSE       SECTION, LOW TRANSVERSE      HYSTERECTOMY      LAPAROSCOPIC LYSIS OF ADHESIONS  8/15/2018    Procedure: LYSIS, ADHESIONS, LAPAROSCOPIC;  Surgeon: Donny Torres MD;  Location: [facility] OR;  Service: OB/GYN;;    LAPAROSCOPIC SALPINGECTOMY Bilateral 8/15/2018    Procedure: SALPINGECTOMY, LAPAROSCOPIC;  Surgeon: Donny Torres MD;  Location: [facility] OR;  Service: OB/GYN;  Laterality: Bilateral;    LAPAROSCOPIC TOTAL HYSTERECTOMY N/A 8/15/2018    Procedure: HYSTERECTOMY, TOTAL, LAPAROSCOPIC;  Surgeon: Donny Torres MD;  Location: [facility] OR;  Service: OB/GYN;  Laterality: N/A;  TF DR NGUYEN PER DR TORRES  RN PRE OP 8-10-18    PELVIC LAPAROSCOPY         Review of patient's allergies indicates:   Allergen Reactions    Clindamycin Rash       No current facility-administered medications on file prior to encounter.     Current Outpatient Medications on File Prior to Encounter   Medication Sig    albuterol (PROVENTIL/VENTOLIN HFA) 90 mcg/actuation inhaler albuterol sulfate Take 2 puff(s) (inhalation) every 4 hours PRN - Wheezing for 30 days 2020 HFA aerosol inhaler every 4 hours inhalation 30 days suspended 90 mcg/actuation    busPIRone (BUSPAR) 15 MG tablet Take 15 mg by mouth 2 (two) times daily.    cetirizine (ZYRTEC) 10 MG tablet Take 1 tablet (10 mg total) by mouth daily as needed for Allergies. (Patient not taking: Reported on 2021)    CHANTIX 1 mg Tab Take 1 mg by mouth 2 (two) times daily.     cholecalciferol, vitamin D3, 1,250 mcg (50,000 unit) capsule Take 50,000 Units by mouth every 7 days.    cyanocobalamin 1,000 mcg/mL injection One cc ( 1mg/cc) IM q week times 4, and then q month thereafter    ergocalciferol (ERGOCALCIFEROL) 50,000 unit Cap     escitalopram oxalate (LEXAPRO) 20 MG tablet Take 20 mg by mouth once daily.    gabapentin (NEURONTIN) 300 MG capsule Take 1 capsule (300 mg total) by mouth every 8 (eight) hours.    hydrocortisone 2.5 % cream Apply topically 2 (two) times daily.    ibuprofen (ADVIL,MOTRIN) 600 MG tablet Take 1 tablet (600 mg total) by mouth 3 (three) times daily.    metoprolol succinate (TOPROL-XL) 25 MG 24 hr tablet Take 25 mg by mouth once daily.    prochlorperazine (COMPAZINE) 10 MG tablet Take 1 tablet (10 mg total) by mouth 3 (three) times daily as needed (nausea).    rizatriptan (MAXALT) 10 MG tablet One po q 2 hour prn migraine. No more than 3 in 24 hours    SUBOXONE 8-2 mg Film PLACE ONE film UNDER THE TONGUE TWICE DAILY     Family History       Problem Relation (Age of Onset)    Hypertension Father, Mother          Tobacco Use    Smoking status: Every Day     Current packs/day: 0.50     Average packs/day: 0.5 packs/day for 13.0 years (6.5 ttl pk-yrs)     Types: Cigarettes    Smokeless tobacco: Never   Substance and Sexual Activity    Alcohol use: No    Drug use: No    Sexual activity: Not Currently     Partners: Male     Birth control/protection: OCP     Review of Systems   Reason unable to perform ROS: ROS was performed and pertinent +s and -s are listed in HPI.     Objective:     Vital Signs (Most Recent):  Temp: 99.8 °F (37.7 °C) (10/06/24 2045)  Pulse: 106 (10/06/24 1938)  Resp: 19 (10/06/24 2108)  BP: 130/80 (10/06/24 1938)  SpO2: 96 % (10/06/24 1938) Vital Signs (24h Range):  Temp:  [98.4 °F (36.9 °C)-103.1 °F (39.5 °C)] 99.8 °F (37.7 °C)  Pulse:  [] 106  Resp:  [17-22] 19  SpO2:  [96 %-99 %] 96 %  BP: (119-138)/(74-83) 130/80     Weight: 39.9 kg (88  "lb)  Body mass index is 17.77 kg/m².     Physical Exam  Constitutional:       General: She is in acute distress (in severe pain).      Appearance: She is underweight. She is ill-appearing. She is not toxic-appearing.   Cardiovascular:      Rate and Rhythm: Regular rhythm. Tachycardia present.      Pulses: Normal pulses.      Heart sounds: Normal heart sounds.   Pulmonary:      Effort: Pulmonary effort is normal.      Breath sounds: Normal breath sounds.   Abdominal:      General: Bowel sounds are normal. There is no distension.      Tenderness: There is abdominal tenderness. There is right CVA tenderness, left CVA tenderness and guarding. There is no rebound.   Skin:     General: Skin is warm and dry.      Capillary Refill: Capillary refill takes less than 2 seconds.   Neurological:      General: No focal deficit present.   Psychiatric:         Mood and Affect: Mood normal.         Behavior: Behavior normal.                Significant Labs: All pertinent labs within the past 24 hours have been reviewed.  Blood Culture: No results for input(s): "LABBLOO" in the last 48 hours.  CBC:   Recent Labs   Lab 10/06/24  1851   WBC 24.07*   HGB 12.6   HCT 37.5        CMP:   Recent Labs   Lab 10/06/24  1851      K 4.3      CO2 21*      BUN 13   CREATININE 0.7   CALCIUM 9.2   PROT 6.9   ALBUMIN 3.4*   BILITOT 0.4   ALKPHOS 78   AST 39   ALT 23   ANIONGAP 11     Urine Culture: No results for input(s): "LABURIN" in the last 48 hours.  Urine Studies:   Recent Labs   Lab 10/06/24  1443   COLORU Yellow   SPECGRAV >=1.030*   NITRITE Negative   UROBILINOGEN 0.2   LEUKOCYTESUR Negative       Significant Imaging: I have reviewed all pertinent imaging results/findings within the past 24 hours.  Assessment/Plan:     * Pyelonephritis  Nephrolithiasis  Hydronephrosis  Sepsis  Opioid use disorder in sustained remission    39F w Hx of Pyelonephritis in 2023 that required ICU stay at [facility] and of Endometriosis s/p " "laparoscopic hysterectomy in 2018 p/w atraumatic nonradiating right flank pain with nausea and vomiting x 2 days. She does also report subjective fever/chills. She reports urine has been darker than normal and foul-smelling but denies any specific urinary symptoms.  Said she felt "like I'm getting septic like I was before."    CT Renal stone: Kidneys/ Ureters: 6 mm nonobstructing renal stone on the left.  No stones on the right.  Diminished image resolution and diminished visualization with suboptimal image quality.  The lack of thin-section imaging significantly diminishes the sensitivity.  5 mm images are submitted.  Possible mild hydronephrosis on the right.  Possible mild edema in the right kidney could be associated with mild inflammatory or infectious process.    Shortly after arrival to [facility] spiked a 103.1 fever.  Tachycardic.  Appeared ill, not toxic    Case d/w on-call Urology attending; assistance is greatly appreciated.  ABX per recs and consult placed for the AM and will contact IR STAT for R nephrostomy tube if she decompensates in the interim.  Vanc + meropenem given her spiking a 103.1 fever and appearing almost toxic on arrival to [facility]  UCX pending, will de-escalate ABX as soon as possible  When ABX de-escalated to PO regimen, will need pyelo-based dosing of meds (eg Cipro 750 BID instead of 500 BID)  Maintenance IVF running until she can tolerate PO  IV Zofran  Dilaudid for pain, will transition to PO Oxy with Dilaudid for breakthrough as soon as she can tolerate PO and from opioids to NSAIDs/Tylenol ASAP  Continue home Suboxone per the recs of the [employer] on Opioid Use Disorder, since do not anticipate her needing opioids for pain control more than 2-3 days.  See Reg Anesth Pain Med. 2021;46(10):840. Epub 2021 Aug 12.         Body mass index (BMI) less than 19  Electrolytes thankfully OK.  High protein high calorie diet ordered.  Boost Plus with all meals.  Will consider " appetite stimulant.        VTE Risk Mitigation (From admission, onward)           Ordered     IP VTE LOW RISK PATIENT  Once         10/06/24 1628     Place sequential compression device  Until discontinued         10/06/24 1628                               Pharmacokinetic Initial Assessment: IV Vancomycin    Assessment/Plan:    Initiate intravenous vancomycin with loading dose of 1000 mg once followed by a maintenance dose of vancomycin 750 mg IV every 24 hours  Desired empiric serum trough concentration is 10 to 20 mcg/mL  Draw vancomycin trough level 60 min prior to third dose on 10/8/24 at approximately 17:00  Pharmacy will continue to follow and monitor vancomycin.      Please contact pharmacy at extension [phone] with any questions regarding this assessment.     Thank you for the consult,   Nyla Mcbride       Patient brief summary:  Ruby Lebron is a 39 y.o. female initiated on antimicrobial therapy with IV Vancomycin for treatment of suspected urinary tract infection    Drug Allergies:   Review of patient's allergies indicates:   Allergen Reactions    Clindamycin Rash       Actual Body Weight:   39.9 kg    Renal Function:   Estimated Creatinine Clearance: 66.6 mL/min (based on SCr of 0.7 mg/dL).,     Dialysis Method (if applicable):  N/A    CBC (last 72 hours):  Recent Labs   Lab Result Units 10/06/24  1851   WBC K/uL 24.07*   Hemoglobin g/dL 12.6   Hematocrit % 37.5   Platelets K/uL 224   Gran % % 82.3*   Lymph % % 10.0*   Mono % % 6.7   Eosinophil % % 0.1   Basophil % % 0.2   Differential Method  Automated       Metabolic Panel (last 72 hours):  Recent Labs   Lab Result Units 10/06/24  1851   Sodium mmol/L 138   Potassium mmol/L 4.3   Chloride mmol/L 106   CO2 mmol/L 21*   Glucose mg/dL 100   BUN mg/dL 13   Creatinine mg/dL 0.7   Albumin g/dL 3.4*   Total Bilirubin mg/dL 0.4   Alkaline Phosphatase U/L 78   AST U/L 39   ALT U/L 23   Magnesium mg/dL 1.9       Drug levels (last 3 results):  No  "results for input(s): "VANCOMYCINRA", "VANCORANDOM", "VANCOMYCINPE", "VANCOPEAK", "VANCOMYCINTR", "VANCOTROUGH" in the last 72 hours.    Microbiologic Results:  Microbiology Results (last 7 days)       Procedure Component Value Units Date/Time    Blood culture x two cultures. Draw prior to antibiotics. [[id number]] Collected: 10/06/24 1552    Order Status: Sent Specimen: Blood from Peripheral, Antecubital, Right Updated: 10/06/24 1815    Blood culture x two cultures. Draw prior to antibiotics. [[id number]] Collected: 10/06/24 1552    Order Status: Sent Specimen: Blood from Peripheral, Antecubital, Right Updated: 10/06/24 1815    Urine culture **CANNOT BE ORDERED STAT** [[id number]] Collected: 10/06/24 1506    Order Status: Sent Specimen: Urine Updated: 10/06/24 1812              Terrance Mcpherson MD  Department of Hospital Medicine  [facility] - Med Surg          "

## 2024-10-07 NOTE — HPI
38 yo woman w/ R flank pain found to have R pyelonephritis. Noted to have similar presentation 10/2023. Denies nausea, vomiting. She denies L flank pain. She has known hx of L renal stone from 10/2023. She denies constipation. Feels better since admission, still has R flank pain.

## 2024-10-08 VITALS
DIASTOLIC BLOOD PRESSURE: 71 MMHG | HEART RATE: 75 BPM | WEIGHT: 88 LBS | SYSTOLIC BLOOD PRESSURE: 132 MMHG | TEMPERATURE: 99 F | OXYGEN SATURATION: 98 % | BODY MASS INDEX: 17.74 KG/M2 | RESPIRATION RATE: 18 BRPM | HEIGHT: 59 IN

## 2024-10-08 LAB
ALBUMIN SERPL BCP-MCNC: 2.7 G/DL (ref 3.5–5.2)
ALP SERPL-CCNC: 103 U/L (ref 55–135)
ALT SERPL W/O P-5'-P-CCNC: 51 U/L (ref 10–44)
ANION GAP SERPL CALC-SCNC: 5 MMOL/L (ref 8–16)
AST SERPL-CCNC: 57 U/L (ref 10–40)
BACTERIA UR CULT: NORMAL
BASOPHILS # BLD AUTO: 0.02 K/UL (ref 0–0.2)
BASOPHILS NFR BLD: 0.2 % (ref 0–1.9)
BILIRUB SERPL-MCNC: 0.4 MG/DL (ref 0.1–1)
BUN SERPL-MCNC: 4 MG/DL (ref 6–20)
CALCIUM SERPL-MCNC: 8.6 MG/DL (ref 8.7–10.5)
CHLORIDE SERPL-SCNC: 104 MMOL/L (ref 95–110)
CO2 SERPL-SCNC: 25 MMOL/L (ref 23–29)
CREAT SERPL-MCNC: 0.5 MG/DL (ref 0.5–1.4)
DIFFERENTIAL METHOD BLD: ABNORMAL
EOSINOPHIL # BLD AUTO: 0 K/UL (ref 0–0.5)
EOSINOPHIL NFR BLD: 0.4 % (ref 0–8)
ERYTHROCYTE [DISTWIDTH] IN BLOOD BY AUTOMATED COUNT: 13.1 % (ref 11.5–14.5)
EST. GFR  (NO RACE VARIABLE): >60 ML/MIN/1.73 M^2
GLUCOSE SERPL-MCNC: 84 MG/DL (ref 70–110)
HCT VFR BLD AUTO: 29.1 % (ref 37–48.5)
HGB BLD-MCNC: 9.7 G/DL (ref 12–16)
IMM GRANULOCYTES # BLD AUTO: 0.08 K/UL (ref 0–0.04)
IMM GRANULOCYTES NFR BLD AUTO: 0.7 % (ref 0–0.5)
LYMPHOCYTES # BLD AUTO: 2.1 K/UL (ref 1–4.8)
LYMPHOCYTES NFR BLD: 19.2 % (ref 18–48)
MAGNESIUM SERPL-MCNC: 1.7 MG/DL (ref 1.6–2.6)
MCH RBC QN AUTO: 30.4 PG (ref 27–31)
MCHC RBC AUTO-ENTMCNC: 33.3 G/DL (ref 32–36)
MCV RBC AUTO: 91 FL (ref 82–98)
MONOCYTES # BLD AUTO: 1 K/UL (ref 0.3–1)
MONOCYTES NFR BLD: 9.6 % (ref 4–15)
NEUTROPHILS # BLD AUTO: 7.6 K/UL (ref 1.8–7.7)
NEUTROPHILS NFR BLD: 69.9 % (ref 38–73)
NRBC BLD-RTO: 0 /100 WBC
PHOSPHATE SERPL-MCNC: 2.7 MG/DL (ref 2.7–4.5)
PLATELET # BLD AUTO: 199 K/UL (ref 150–450)
PMV BLD AUTO: 10.1 FL (ref 9.2–12.9)
POTASSIUM SERPL-SCNC: 3.5 MMOL/L (ref 3.5–5.1)
PROT SERPL-MCNC: 5.3 G/DL (ref 6–8.4)
RBC # BLD AUTO: 3.19 M/UL (ref 4–5.4)
SODIUM SERPL-SCNC: 134 MMOL/L (ref 136–145)
WBC # BLD AUTO: 10.87 K/UL (ref 3.9–12.7)

## 2024-10-08 PROCEDURE — 36415 COLL VENOUS BLD VENIPUNCTURE: CPT

## 2024-10-08 PROCEDURE — 80053 COMPREHEN METABOLIC PANEL: CPT

## 2024-10-08 PROCEDURE — 83735 ASSAY OF MAGNESIUM: CPT

## 2024-10-08 PROCEDURE — 84100 ASSAY OF PHOSPHORUS: CPT

## 2024-10-08 PROCEDURE — 99232 SBSQ HOSP IP/OBS MODERATE 35: CPT | Mod: ,,, | Performed by: UROLOGY

## 2024-10-08 PROCEDURE — 85025 COMPLETE CBC W/AUTO DIFF WBC: CPT

## 2024-10-08 PROCEDURE — 25000003 PHARM REV CODE 250: Performed by: STUDENT IN AN ORGANIZED HEALTH CARE EDUCATION/TRAINING PROGRAM

## 2024-10-08 PROCEDURE — 25000003 PHARM REV CODE 250

## 2024-10-08 PROCEDURE — 63600175 PHARM REV CODE 636 W HCPCS

## 2024-10-08 PROCEDURE — 63600175 PHARM REV CODE 636 W HCPCS: Performed by: STUDENT IN AN ORGANIZED HEALTH CARE EDUCATION/TRAINING PROGRAM

## 2024-10-08 RX ORDER — CIPROFLOXACIN 500 MG/1
500 TABLET ORAL EVERY 12 HOURS
Qty: 18 TABLET | Refills: 0 | Status: SHIPPED | OUTPATIENT
Start: 2024-10-08 | End: 2024-10-17

## 2024-10-08 RX ADMIN — CEFTRIAXONE SODIUM 2 G: 2 INJECTION, POWDER, FOR SOLUTION INTRAMUSCULAR; INTRAVENOUS at 10:10

## 2024-10-08 RX ADMIN — ESCITALOPRAM OXALATE 20 MG: 10 TABLET ORAL at 08:10

## 2024-10-08 RX ADMIN — HYDROMORPHONE HYDROCHLORIDE 0.5 MG: 1 INJECTION, SOLUTION INTRAMUSCULAR; INTRAVENOUS; SUBCUTANEOUS at 03:10

## 2024-10-08 RX ADMIN — SODIUM CHLORIDE, POTASSIUM CHLORIDE, SODIUM LACTATE AND CALCIUM CHLORIDE: 600; 310; 30; 20 INJECTION, SOLUTION INTRAVENOUS at 06:10

## 2024-10-08 RX ADMIN — BUSPIRONE HYDROCHLORIDE 15 MG: 10 TABLET ORAL at 08:10

## 2024-10-08 RX ADMIN — ACETAMINOPHEN 650 MG: 325 TABLET ORAL at 08:10

## 2024-10-08 RX ADMIN — HYDROMORPHONE HYDROCHLORIDE 0.5 MG: 1 INJECTION, SOLUTION INTRAMUSCULAR; INTRAVENOUS; SUBCUTANEOUS at 09:10

## 2024-10-08 RX ADMIN — MEROPENEM 1 G: 1 INJECTION INTRAVENOUS at 04:10

## 2024-10-08 RX ADMIN — ONDANSETRON 4 MG: 2 INJECTION INTRAMUSCULAR; INTRAVENOUS at 08:10

## 2024-10-08 NOTE — SUBJECTIVE & OBJECTIVE
Interval History: No fevers. Mild R flank pain. Denies dysuria, other LUTS. Asking to go home.     Review of Systems   Constitutional:  Negative for activity change, appetite change, chills, diaphoresis and fever.   HENT:  Negative for hearing loss.    Eyes:  Negative for visual disturbance.   Respiratory:  Negative for cough, shortness of breath and wheezing.    Gastrointestinal:  Negative for abdominal distention, abdominal pain, constipation, nausea and vomiting.   Genitourinary:  Positive for flank pain. Negative for difficulty urinating, dysuria and hematuria.   Musculoskeletal:  Negative for neck pain and neck stiffness.   Neurological:  Negative for dizziness and weakness.     Objective:     Temp:  [98.2 °F (36.8 °C)-98.9 °F (37.2 °C)] 98.9 °F (37.2 °C)  Pulse:  [68-88] 75  Resp:  [17-22] 18  SpO2:  [95 %-99 %] 98 %  BP: ()/(57-81) 132/71     Body mass index is 17.77 kg/m².           Drains       None                    Physical Exam  Constitutional:       Appearance: She is well-developed.   HENT:      Head: Normocephalic and atraumatic.   Eyes:      Conjunctiva/sclera: Conjunctivae normal.   Pulmonary:      Effort: Pulmonary effort is normal. No respiratory distress.   Abdominal:      General: Abdomen is flat. There is no distension.      Palpations: Abdomen is soft. There is no mass.      Tenderness: There is no abdominal tenderness. There is right CVA tenderness. There is no left CVA tenderness or guarding.   Skin:     General: Skin is warm.      Findings: No rash.   Neurological:      Mental Status: She is alert and oriented to person, place, and time.   Psychiatric:         Behavior: Behavior normal.           Significant Labs:    BMP:  Recent Labs   Lab 10/06/24  1851 10/07/24  0628 10/08/24  0528    138 134*   K 4.3 3.8 3.5    104 104   CO2 21* 25 25   BUN 13 10 4*   CREATININE 0.7 0.6 0.5   CALCIUM 9.2 8.4* 8.6*       CBC:   Recent Labs   Lab 10/06/24  1851 10/07/24  0628  10/08/24  0528   WBC 24.07* 16.21* 10.87   HGB 12.6 10.0* 9.7*   HCT 37.5 31.3* 29.1*    175 199       Blood Culture:   Recent Labs   Lab 10/06/24  1552   LABBLOO No Growth to date  No Growth to date  No Growth to date  No Growth to date     Urine Culture:   Recent Labs   Lab 10/06/24  1506   LABURIN No significant growth     Urine Studies:   Recent Labs   Lab 10/06/24  1443   COLORU Yellow   SPECGRAV >=1.030*   NITRITE Negative   UROBILINOGEN 0.2   LEUKOCYTESUR Negative       Significant Imaging:  All pertinent imaging results/findings from the past 24 hours have been reviewed.

## 2024-10-08 NOTE — PLAN OF CARE
Case Management Final Discharge Note      Discharge Disposition: home    New DME ordered / company name: none    Relevant SDOH / Transition of Care Barriers:  none    Primary Caretaker and contact information: Chioma Boswell (Mother)  [phone] (Mobile)     Scheduled followup appointment: Patient to schedule hospital follow-up with [facility].    Referrals placed: none    Transportation: Family    Patient and family educated on discharge services and updated on DC plan. Bedside RN notified, patient clear to discharge from Case Management Perspective.      10/08/24 1415   Final Note   Assessment Type Final Discharge Note   Anticipated Discharge Disposition Home   What phone number can be called within the next 1-3 days to see how you are doing after discharge? [phone]   Hospital Resources/Appts/Education Provided Appointments scheduled and added to AVS   Post-Acute Status   Coverage Erika Olson   Discharge Delays None known at this time

## 2024-10-08 NOTE — PLAN OF CARE
Problem: Adult Inpatient Plan of Care  Goal: Absence of Hospital-Acquired Illness or Injury  Outcome: Progressing  Goal: Optimal Comfort and Wellbeing  Outcome: Progressing     Problem: Sepsis/Septic Shock  Goal: Optimal Coping  Outcome: Progressing  Goal: Absence of Bleeding  Outcome: Progressing  Goal: Absence of Infection Signs and Symptoms  Outcome: Progressing  Goal: Optimal Nutrition Intake  Outcome: Progressing     Pt transferred to room 244 via wheelchair, no s/s of distress.  Call light reviewed, left within reach.  Plan of care and safety reviewed, verbalized understanding.

## 2024-10-08 NOTE — NURSING
Pt discharged in stable condition.  Pt taken to second floor of garage in wheelchair.  Pt accompanied by significant other and TRINI Montes De Oca RN.

## 2024-10-08 NOTE — NURSING
For transfer to 2nd floor as ordered to room 244. Report given to MB nurse. Pt informed. Wheeled pt via wheelchair safely and comfortably with pt belongings. Tele box discontinued per ordered. Anh informed.

## 2024-10-08 NOTE — ASSESSMENT & PLAN NOTE
Personally reviewed CT scan  Urine culture - negative  Blood cultures prelim negative  Treatment with antibiotics planned for 2 weeks,okay to transition to PO abx. Recommend 2 week course.   During 10/2023 presentation her urine culture demonstrated Ecoli  Avoid constipation

## 2024-10-08 NOTE — PROGRESS NOTES
[facility]  Urology  Progress Note    Patient Name: Ruby Lebron  MRN: [id number]  Admission Date: 10/6/2024  Hospital Length of Stay: 2 days  Code Status: Full Code   Attending Provider: Rob Andrew MD   Primary Care Physician: Tamara Worrell MD (Inactive)    Subjective:     HPI:  40 yo woman w/ R flank pain found to have R pyelonephritis. Noted to have similar presentation 10/2023. Denies nausea, vomiting. She denies L flank pain. She has known hx of L renal stone from 10/2023. She denies constipation. Feels better since admission, still has R flank pain.     Interval History: No fevers. Mild R flank pain. Denies dysuria, other LUTS. Asking to go home.     Review of Systems   Constitutional:  Negative for activity change, appetite change, chills, diaphoresis and fever.   HENT:  Negative for hearing loss.    Eyes:  Negative for visual disturbance.   Respiratory:  Negative for cough, shortness of breath and wheezing.    Gastrointestinal:  Negative for abdominal distention, abdominal pain, constipation, nausea and vomiting.   Genitourinary:  Positive for flank pain. Negative for difficulty urinating, dysuria and hematuria.   Musculoskeletal:  Negative for neck pain and neck stiffness.   Neurological:  Negative for dizziness and weakness.     Objective:     Temp:  [98.2 °F (36.8 °C)-98.9 °F (37.2 °C)] 98.9 °F (37.2 °C)  Pulse:  [68-88] 75  Resp:  [17-22] 18  SpO2:  [95 %-99 %] 98 %  BP: ()/(57-81) 132/71     Body mass index is 17.77 kg/m².           Drains       None                    Physical Exam  Constitutional:       Appearance: She is well-developed.   HENT:      Head: Normocephalic and atraumatic.   Eyes:      Conjunctiva/sclera: Conjunctivae normal.   Pulmonary:      Effort: Pulmonary effort is normal. No respiratory distress.   Abdominal:      General: Abdomen is flat. There is no distension.      Palpations: Abdomen is soft. There is no mass.      Tenderness: There is  no abdominal tenderness. There is right CVA tenderness. There is no left CVA tenderness or guarding.   Skin:     General: Skin is warm.      Findings: No rash.   Neurological:      Mental Status: She is alert and oriented to person, place, and time.   Psychiatric:         Behavior: Behavior normal.           Significant Labs:    BMP:  Recent Labs   Lab 10/06/24  1851 10/07/24  0628 10/08/24  0528    138 134*   K 4.3 3.8 3.5    104 104   CO2 21* 25 25   BUN 13 10 4*   CREATININE 0.7 0.6 0.5   CALCIUM 9.2 8.4* 8.6*       CBC:   Recent Labs   Lab 10/06/24  1851 10/07/24  0628 10/08/24  0528   WBC 24.07* 16.21* 10.87   HGB 12.6 10.0* 9.7*   HCT 37.5 31.3* 29.1*    175 199       Blood Culture:   Recent Labs   Lab 10/06/24  1552   LABBLOO No Growth to date  No Growth to date  No Growth to date  No Growth to date     Urine Culture:   Recent Labs   Lab 10/06/24  1506   LABURIN No significant growth     Urine Studies:   Recent Labs   Lab 10/06/24  1443   COLORU Yellow   SPECGRAV >=1.030*   NITRITE Negative   UROBILINOGEN 0.2   LEUKOCYTESUR Negative       Significant Imaging:  All pertinent imaging results/findings from the past 24 hours have been reviewed.                  Assessment/Plan:     * Pyelonephritis  Personally reviewed CT scan  Urine culture - negative  Blood cultures prelim negative  Treatment with antibiotics planned for 2 weeks,okay to transition to PO abx. Recommend 2 week course.   During 10/2023 presentation her urine culture demonstrated Ecoli  Avoid constipation    Hydronephrosis  Non obstructing  2/2 to pyelonephritis    Nephrolithiasis  Left renal stone, asymptomatic, presence since 10/2023  Outpatient follow up     Okay for discharge home from  standpoint.     VTE Risk Mitigation (From admission, onward)           Ordered     IP VTE LOW RISK PATIENT  Once         10/06/24 1628     Place sequential compression device  Until discontinued         10/06/24 1628                     Radha Donovan MD  Urology  [facility]

## 2024-10-08 NOTE — NURSING
Discharge instructions given per AVS.  Pt verbalized understanding of instructions, when to notify physicians of warning signs and/or present to the ED and to follow up with [facility].  Instructed pt to finish antibiotics, although she may feel better in a few days.      A list of community resources provided to pt per AVS.

## 2024-10-08 NOTE — PROGRESS NOTES
VANCOMYCIN DOSING BY PHARMACY DISCONTINUATION NOTE    Ruby Boswell Chante Lebron is a 39 y.o. female who had been consulted for vancomycin dosing.    The pharmacy consult for vancomycin dosing has been discontinued.     Vancomycin Dosing by Pharmacy Consult will sign-off. Please reconsult if necessary. Thank you for allowing us to participate in this patient's care.     Belkis Mcbride  [phone]

## 2024-10-08 NOTE — NURSING
[facility]  Nurses Note -- 4 Eyes      10/7/2024       Skin assessed on: Q Shift      [x] No Pressure Injuries Present    [x]Prevention Measures Documented    [] Yes LDA  for Pressure Injury Previously documented     [] Yes New Pressure Injury Discovered   [] LDA for New Pressure Injury Added      Attending RN:  Fadi Ferreira RN     Second RN:  NELDA Munson

## 2024-10-09 NOTE — HOSPITAL COURSE
Ms. Lebron is a 39-year-old who was admitted with sepsis secondary to pyelonephritis.  Blood cultures and urine cultures ordered and patient started on broad-spectrum IV antibiotics.  White count initially 41985 and returned to normal before discharge.  Pain improving and tolerating diet.  Patient overall feeling better.  Urine cultures with no growth at this time however previous cultures were pansensitive.  Urology following along for mild hydronephrosis and left nonobstructing nephrolithiasis.  No indication for any procedures during this hospital stay.  Overall, patient improving and stable for discharge home.  She will be discharged home with a course of ciprofloxacin for treatment of pyelonephritis and sepsis.  She will need to follow up with her PCP as an outpatient.

## 2024-10-09 NOTE — DISCHARGE SUMMARY
"[facility]  [facility] Medicine  Discharge Summary      Patient Name: Ruby Lebron  MRN: [id number]  RODY: [id number]  Patient Class: IP- Inpatient  Admission Date: 10/6/2024  Hospital Length of Stay: 2 days  Discharge Date and Time: 10/8/2024  1:55 PM  Attending Physician: No att. providers found   Discharging Provider: Rob Andrew MD  Primary Care Provider: Tamara Worrell MD (Inactive)    Primary Care Team: Networked reference to record PCT     HPI:   Ruby Lebron is a 39 y.o. female with Hx of Pyelonephritis in 2023 c/b septic shock, and  Opioid use disorder in remission on Suboxone  who presented to [facility] ED on 10/06/2024 for eval and treatment of bilateral flank pain R > L.  They describe their pain as sharp and similar to 2023, 10/10, located "all along my sides, especially deep inside" with radiation towards her groin.  It started a few days prior to presentation and has been worsening since; now constant.  There is associated fever, chills, nausea, vomitting.  They deny associated headache, dyspnea.  Symptoms are alleviated by nothing.  Symptoms are worsened by movement.  She reports last taking her suboxone on 10/4 due to inability to tolerate anything PO and denies use of other opioids in the interim.    ED course notable for the following: Appeared very uncomfortable.  VS with tachycardia and 99.6 fever.  Exam w R CVA tenderness.  Labs WBC 26, UA dirty.  CT Renal stone: Kidneys/ Ureters: 6 mm nonobstructing renal stone on the left.  No stones on the right.  Diminished image resolution and diminished visualization with suboptimal image quality.  Possible mild hydronephrosis on the right.  Possible mild edema in the right kidney.  No hydroureter.  ED therapy/stabilization measures: Started on CTX and pain control w morphine.  On arrival to [facility] she appeared ill and in significant pain.  Spiked a 103.1 fever soon after.  They were admitted to [facility] for further evaluation and treatment.     * No surgery found *      Hospital Course:   Ms. Lebron is a 39-year-old who was admitted with sepsis secondary to pyelonephritis.  Blood cultures and urine cultures ordered and patient started on broad-spectrum IV antibiotics.  White count initially 26083 and returned to normal before discharge.  Pain improving and tolerating diet.  Patient overall feeling better.  Urine cultures with no growth at this time however previous cultures were pansensitive.  Urology following along for mild hydronephrosis and left nonobstructing nephrolithiasis.  No indication for any procedures during this hospital stay.  Overall, patient improving and stable for discharge home.  She will be discharged home with a course of ciprofloxacin for treatment of pyelonephritis and sepsis.  She will need to follow up with her PCP as an outpatient.     Goals of Care Treatment Preferences:  Code Status: Full Code      SDOH Screening:  The patient was screened for food insecurity, housing instability, transportation needs, utility difficulties, and interpersonal safety. The social determinant(s) of health identified as a concern this admission are:  Transportation difficulties    The plan to address these concerns is:  consult    Social Drivers of Health with Concerns     Transportation Needs: Unmet Transportation Needs (10/7/2024)        Consults:   Consults (From admission, onward)          Status Ordering Provider     Inpatient consult to Urology  Once        Provider:  Cyndie Graham MD    Completed RUBEN MATIAS            No new Assessment & Plan notes have been filed under this hospital service since the last note was generated.  Service: Hospital Medicine    Final Active Diagnoses:    Diagnosis Date Noted POA    PRINCIPAL PROBLEM:  Pyelonephritis [N12] 10/06/2024 Yes    Opioid use disorder, severe, in sustained remission [F11.21] 10/06/2024 Yes     Chronic     Nephrolithiasis [N20.0] 10/06/2024 Yes    Hydronephrosis [N13.30] 10/06/2024 Yes    Sepsis without acute organ dysfunction [A41.9] 10/06/2024 Yes    Body mass index (BMI) less than 19 [Z68.1] 10/06/2024 Not Applicable     Chronic      Problems Resolved During this Admission:       Discharged Condition: stable    Disposition: Home or Self Care    Follow Up:   Follow-up Information       [facility]. Schedule an appointment as soon as possible for a visit.    Contact information:  [id number] [id number]  Phone: [phone]                         Patient Instructions:      Diet Adult Regular     Notify your health care provider if you experience any of the following:  temperature >100.4     Notify your health care provider if you experience any of the following:  persistent nausea and vomiting or diarrhea     Notify your health care provider if you experience any of the following:  severe uncontrolled pain     Notify your health care provider if you experience any of the following:  difficulty breathing or increased cough     Notify your health care provider if you experience any of the following:  severe persistent headache     Notify your health care provider if you experience any of the following:  worsening rash     Notify your health care provider if you experience any of the following:  persistent dizziness, light-headedness, or visual disturbances     Notify your health care provider if you experience any of the following:  increased confusion or weakness     Activity as tolerated       Significant Diagnostic Studies: Labs: All labs within the past 24 hours have been reviewed    Pending Diagnostic Studies:       None           Medications:  Reconciled Home Medications:      Medication List        START taking these medications      ciprofloxacin HCl 500 MG tablet  Commonly known as: CIPRO  Take 1 tablet (500 mg total) by mouth every 12 (twelve) hours. for  9 days            CONTINUE taking these medications      albuterol 90 mcg/actuation inhaler  Commonly known as: PROVENTIL/VENTOLIN HFA  albuterol sulfate Take 2 puff(s) (inhalation) every 4 hours PRN - Wheezing for 30 days [id number] HFA aerosol inhaler every 4 hours inhalation 30 days suspended 90 mcg/actuation     busPIRone 15 MG tablet  Commonly known as: BUSPAR  Take 15 mg by mouth 2 (two) times daily.     cyanocobalamin 1,000 mcg/mL injection  One cc ( 1mg/cc) IM q week times 4, and then q month thereafter     EScitalopram oxalate 20 MG tablet  Commonly known as: LEXAPRO  Take 20 mg by mouth once daily.     rizatriptan 10 MG tablet  Commonly known as: MAXALT  One po q 2 hour prn migraine. No more than 3 in 24 hours            STOP taking these medications      cetirizine 10 MG tablet  Commonly known as: ZYRTEC     CHANTIX 1 mg Tab  Generic drug: varenicline     cholecalciferol (vitamin D3) 1,250 mcg (50,000 unit) capsule     ergocalciferol 50,000 unit Cap  Commonly known as: ERGOCALCIFEROL     gabapentin 300 MG capsule  Commonly known as: NEURONTIN     hydrocortisone 2.5 % cream     ibuprofen 600 MG tablet  Commonly known as: ADVIL,MOTRIN     metoprolol succinate 25 MG 24 hr tablet  Commonly known as: TOPROL-XL     prochlorperazine 10 MG tablet  Commonly known as: COMPAZINE     SUBOXONE 8-2 mg  Generic drug: buprenorphine-naloxone 8-2 mg              Indwelling Lines/Drains at time of discharge:   Lines/Drains/Airways       None                   Time spent on the discharge of patient: >35 minutes         Rob Andrew MD  Department of Hospital Medicine  [facility] [facility]

## 2024-10-10 LAB
BACTERIA BLD CULT: NORMAL
BACTERIA BLD CULT: NORMAL

## (undated) DEVICE — DRAPE STERI LONG

## (undated) DEVICE — NDL INSUF ULTRA VERESS 120MM

## (undated) DEVICE — UNDERGLOVES BIOGEL PI SZ 6 LF

## (undated) DEVICE — HOOK DISSECTING 5MM

## (undated) DEVICE — PAD PREP 50/CA

## (undated) DEVICE — SEE MEDLINE ITEM 154981

## (undated) DEVICE — PAD SANITARY OB STERILE

## (undated) DEVICE — ENDOSTITCH INSTRUMENT

## (undated) DEVICE — SEE MEDLINE ITEM 146372

## (undated) DEVICE — SEE MEDLINE ITEM 152622

## (undated) DEVICE — CANISTER SUCTION 2 LTR

## (undated) DEVICE — SEE MEDLINE ITEM 157117

## (undated) DEVICE — TROCAR ENDOPATH XCEL 11MM 10CM

## (undated) DEVICE — CHLORAPREP W TINT 26ML APPL

## (undated) DEVICE — DRESSING ADH ISLAND 2.5 X 3

## (undated) DEVICE — SYR 10CC LUER LOCK

## (undated) DEVICE — GLOVE SURGICAL LATEX SZ 7

## (undated) DEVICE — TRAY FOLEY 16FR INFECTION CONT

## (undated) DEVICE — ADAPTER DISP HAND SWITCH

## (undated) DEVICE — TROCAR ENDOPATH XCEL 5X100MM

## (undated) DEVICE — SUT VLOC 180 ABSORB ESTITCH

## (undated) DEVICE — COVER OVERHEAD SURG LT BLUE

## (undated) DEVICE — SYR 50CC LL

## (undated) DEVICE — SOL CLEARIFY VISUALIZATION LAP

## (undated) DEVICE — SEE L#120831

## (undated) DEVICE — SOL NS 1000CC

## (undated) DEVICE — CLOSURE SKIN STERI STRIP 1/2X4

## (undated) DEVICE — ELECTRODE REM PLYHSV RETURN 9

## (undated) DEVICE — SEE MEDLINE ITEM 146292

## (undated) DEVICE — PACK LAPAROSCOPY/PELVISCOPY II

## (undated) DEVICE — TIP RUMI WHITE DISP UMW676

## (undated) DEVICE — SEE MEDLINE ITEM 152487

## (undated) DEVICE — Device

## (undated) DEVICE — SUPPORT ULNA NERVE PROTECTOR

## (undated) DEVICE — SCISSOR CURVED ENDOPATH 5MM

## (undated) DEVICE — KIT ANTIFOG

## (undated) DEVICE — BLADE SURG CARBON STEEL SZ11

## (undated) DEVICE — OCCLUDER COLPO-PNEUMO STERILE

## (undated) DEVICE — DEVICE N-SEAL LAPROSCOPIC

## (undated) DEVICE — TUBING INSUFFLATION 10

## (undated) DEVICE — IRRIGATOR ENDOSCOPY DISP.

## (undated) DEVICE — SUT 0 VICRYL / UR6 (J603)

## (undated) DEVICE — SUT 4/0 18IN COATED VICRYL

## (undated) DEVICE — SUT CTD VICRYL 0 UND BR CT